# Patient Record
Sex: MALE | Race: WHITE | Employment: FULL TIME | URBAN - METROPOLITAN AREA
[De-identification: names, ages, dates, MRNs, and addresses within clinical notes are randomized per-mention and may not be internally consistent; named-entity substitution may affect disease eponyms.]

---

## 2018-11-12 ENCOUNTER — OFFICE VISIT (OUTPATIENT)
Dept: ORTHOPEDIC SURGERY | Age: 68
End: 2018-11-12
Payer: MEDICARE

## 2018-11-12 VITALS — WEIGHT: 230 LBS | BODY MASS INDEX: 32.93 KG/M2 | HEIGHT: 70 IN

## 2018-11-12 DIAGNOSIS — M25.561 RIGHT KNEE PAIN, UNSPECIFIED CHRONICITY: Primary | ICD-10-CM

## 2018-11-12 PROBLEM — E29.1 HYPOGONADISM MALE: Status: ACTIVE | Noted: 2018-08-15

## 2018-11-12 PROBLEM — Z72.0 TOBACCO ABUSE: Status: ACTIVE | Noted: 2017-07-14

## 2018-11-12 PROBLEM — E11.9 TYPE 2 DIABETES MELLITUS WITHOUT COMPLICATION, WITHOUT LONG-TERM CURRENT USE OF INSULIN (HCC): Status: ACTIVE | Noted: 2017-07-14

## 2018-11-12 PROCEDURE — 3017F COLORECTAL CA SCREEN DOC REV: CPT | Performed by: ORTHOPAEDIC SURGERY

## 2018-11-12 PROCEDURE — G8484 FLU IMMUNIZE NO ADMIN: HCPCS | Performed by: ORTHOPAEDIC SURGERY

## 2018-11-12 PROCEDURE — G8427 DOCREV CUR MEDS BY ELIG CLIN: HCPCS | Performed by: ORTHOPAEDIC SURGERY

## 2018-11-12 PROCEDURE — 1101F PT FALLS ASSESS-DOCD LE1/YR: CPT | Performed by: ORTHOPAEDIC SURGERY

## 2018-11-12 PROCEDURE — 4040F PNEUMOC VAC/ADMIN/RCVD: CPT | Performed by: ORTHOPAEDIC SURGERY

## 2018-11-12 PROCEDURE — 1123F ACP DISCUSS/DSCN MKR DOCD: CPT | Performed by: ORTHOPAEDIC SURGERY

## 2018-11-12 PROCEDURE — 4004F PT TOBACCO SCREEN RCVD TLK: CPT | Performed by: ORTHOPAEDIC SURGERY

## 2018-11-12 PROCEDURE — G8417 CALC BMI ABV UP PARAM F/U: HCPCS | Performed by: ORTHOPAEDIC SURGERY

## 2018-11-12 PROCEDURE — 99204 OFFICE O/P NEW MOD 45 MIN: CPT | Performed by: ORTHOPAEDIC SURGERY

## 2018-11-12 RX ORDER — MELOXICAM 15 MG/1
15 TABLET ORAL
Status: ON HOLD | COMMUNITY
Start: 2018-10-16 | End: 2022-06-28 | Stop reason: HOSPADM

## 2018-11-12 RX ORDER — LISINOPRIL 5 MG/1
10 TABLET ORAL
Refills: 0 | COMMUNITY
Start: 2018-11-04

## 2018-11-12 RX ORDER — ATORVASTATIN CALCIUM 40 MG/1
TABLET, FILM COATED ORAL
Refills: 4 | COMMUNITY
Start: 2018-11-04

## 2018-11-12 RX ORDER — ASPIRIN 81 MG/1
81 TABLET, CHEWABLE ORAL DAILY
Status: ON HOLD | COMMUNITY
Start: 2017-08-15 | End: 2022-06-28 | Stop reason: HOSPADM

## 2018-11-12 RX ORDER — ATORVASTATIN CALCIUM 40 MG/1
TABLET, FILM COATED ORAL
COMMUNITY
Start: 2018-05-07 | End: 2019-11-25

## 2018-11-12 RX ORDER — MELOXICAM 15 MG/1
TABLET ORAL
Refills: 2 | COMMUNITY
Start: 2018-10-16 | End: 2019-11-25

## 2018-11-12 RX ORDER — TRAMADOL HYDROCHLORIDE 50 MG/1
TABLET ORAL
Refills: 0 | Status: ON HOLD | COMMUNITY
Start: 2018-10-24 | End: 2022-06-28 | Stop reason: ALTCHOICE

## 2018-11-12 RX ORDER — BENZONATATE 200 MG/1
CAPSULE ORAL
Refills: 0 | Status: ON HOLD | COMMUNITY
Start: 2018-09-28 | End: 2022-06-28 | Stop reason: ALTCHOICE

## 2018-11-12 RX ORDER — LISINOPRIL 5 MG/1
5 TABLET ORAL
COMMUNITY
Start: 2018-11-01 | End: 2019-11-25

## 2018-11-12 ASSESSMENT — ENCOUNTER SYMPTOMS: SINUS PAIN: 1

## 2018-11-12 NOTE — PROGRESS NOTES
Date:  2018    Name:  Madeline Motley  Address:  [address] [address]    :  1950      Age:   80 y.o.    SSN:  [id number]      Medical Record Number:  [id number]    Reason for Visit:    Chief Complaint    Knee Pain (np right knee pain.)      DOS:2018     HPI: Madeline Motley is a 80 y.o. male here today for evaluation of bilateral knee osteoarthritis. The patient is a  and has had a long-standing history of right knee pain and stiffness that is his chief complaint. He was seen by Dr. Gaetano Traore advised that he will require a total knee arthroplasty after failure of a corticosteroid injection. Consequently they have tried a corticosteroid injection 1 week ago and had little relief of his pain. He reports one incident where his knee was locked upon waking up in the morning but this then abated over time. He has not had repeat mechanical symptoms since. He presents today for a second opinion      Pain Assessment  Location of Pain: Knee  Location Modifiers: Right  Severity of Pain: 2  Quality of Pain: Aching, Sharp, Locking, Popping  Duration of Pain: Persistent  Frequency of Pain: Constant  Aggravating Factors:  (certain movement )  Limiting Behavior: Yes  Relieving Factors: Rest  Result of Injury: No  Work-Related Injury: No  Are there other pain locations you wish to document?: No  ROS: All systems reviewed on patient intake form. Pertinent items are noted in HPI. Past Medical History:   Diagnosis Date    Arthritis     Diabetes [facility])         Past Surgical History:   Procedure Laterality Date    HERNIA REPAIR      TONSILLECTOMY         No family history on file. Social History     Social History    Marital status:       Spouse name: N/A    Number of children: N/A    Years of education: N/A     Social History Main Topics    Smoking status: Current Every Day Smoker    Smokeless tobacco: Never Used    Alcohol use No    Drug use: No   

## 2018-11-13 NOTE — PROGRESS NOTES
Sexual activity: Not Asked     Other Topics Concern    None     Social History Narrative    None       Current Outpatient Prescriptions   Medication Sig Dispense Refill    aspirin 81 MG chewable tablet Take 81 mg by mouth      atorvastatin (LIPITOR) 40 MG tablet TAKE 1 TABLET BY MOUTH ONE TIME A DAY       Lancets Misc. MISC 1 Stick by Other route      lisinopril (PRINIVIL;ZESTRIL) 5 MG tablet Take 5 mg by mouth      meloxicam (MOBIC) 15 MG tablet Take 15 mg by mouth      metFORMIN (GLUCOPHAGE) 500 MG tablet Take 2 tabs iby mouth in the morning and 1 tab by mouth in the evening.  ONE TOUCH ULTRA TEST strip use to test blood sugar once daily  11    atorvastatin (LIPITOR) 40 MG tablet TAKE 1 TABLET BY MOUTH ONE TIME A DAY  4    benzonatate (TESSALON) 200 MG capsule TAKE 1 CAPSULE BY MOUTH THREE TIMES A DAY AS NEEDED FOR COUGH FOR UP TO 10 DAYS  0    lisinopril (PRINIVIL;ZESTRIL) 5 MG tablet TAKE 1 TABLET BY MOUTH ONE TIME A DAY. MUST MAKE APPOINTMENT FOR FURTHER REFILLS.  0    meloxicam (MOBIC) 15 MG tablet TAKE 1 TABLET BY MOUTH ONE TIME A DAY  2    metFORMIN (GLUCOPHAGE) 500 MG tablet TAKE 1 TABLET BY MOUTH ONCE A DAY FOR 7 DAYS, THEN INCREASE TO TWO TIMES A DAY  4    traMADol (ULTRAM) 50 MG tablet TAKE 1 OR 2 TABLETS BY MOUTH EVERY EIGHT HOURS AS NEEDED  0     No current facility-administered medications for this visit. Allergies   Allergen Reactions    No Known Allergies        Vital signs:  Ht 5' 10\" (1.778 m)   Wt 230 lb (104.3 kg)   BMI 33.00 kg/m²        Neuro: Alert & oriented x 3,  normal,  no focal deficits noted. Normal affect. Eyes: sclera clear  Ears: Normal external ear  Mouth:  No perioral lesions  Pulm: Respirations unlabored and regular  Pulse: Regular rate    Skin: Warm, well perfused        Right knee exam    Gait: No use of assistive devices. No antalgic gait. Alignment: Varus Alignment appreciated. Inspection/skin: Quadriceps well developed.  Skin is intact physical examination. In addition, I discussed the patient's condition and treatment options with them. All of their questions were answered. I personally reviewed the patient's pain scale, review of systems, family history, social history, past medical history, allergies and medications. 13 point review of systems was collected today and is filed in the medical record. Farhan Jackson MD  Sports Medicine, Arthroscopic Knee and Shoulder Surgery    This dictation was performed with a verbal recognition program [facility]) and it was checked for errors. It is possible that there are still dictated errors within this office note. If so, please bring any errors to my attention for an addendum. All efforts were made to ensure that this office note is accurate.

## 2019-01-28 ENCOUNTER — OFFICE VISIT (OUTPATIENT)
Dept: ORTHOPEDIC SURGERY | Age: 69
End: 2019-01-28
Payer: MEDICARE

## 2019-01-28 VITALS
SYSTOLIC BLOOD PRESSURE: 126 MMHG | HEIGHT: 70 IN | HEART RATE: 87 BPM | DIASTOLIC BLOOD PRESSURE: 79 MMHG | WEIGHT: 229.94 LBS | BODY MASS INDEX: 32.92 KG/M2

## 2019-01-28 DIAGNOSIS — M17.11 PRIMARY OSTEOARTHRITIS OF RIGHT KNEE: Primary | ICD-10-CM

## 2019-01-28 PROCEDURE — 1101F PT FALLS ASSESS-DOCD LE1/YR: CPT | Performed by: ORTHOPAEDIC SURGERY

## 2019-01-28 PROCEDURE — 1123F ACP DISCUSS/DSCN MKR DOCD: CPT | Performed by: ORTHOPAEDIC SURGERY

## 2019-01-28 PROCEDURE — G8417 CALC BMI ABV UP PARAM F/U: HCPCS | Performed by: ORTHOPAEDIC SURGERY

## 2019-01-28 PROCEDURE — 99213 OFFICE O/P EST LOW 20 MIN: CPT | Performed by: ORTHOPAEDIC SURGERY

## 2019-01-28 PROCEDURE — 4004F PT TOBACCO SCREEN RCVD TLK: CPT | Performed by: ORTHOPAEDIC SURGERY

## 2019-01-28 PROCEDURE — G8427 DOCREV CUR MEDS BY ELIG CLIN: HCPCS | Performed by: ORTHOPAEDIC SURGERY

## 2019-01-28 PROCEDURE — G8484 FLU IMMUNIZE NO ADMIN: HCPCS | Performed by: ORTHOPAEDIC SURGERY

## 2019-01-28 PROCEDURE — 3017F COLORECTAL CA SCREEN DOC REV: CPT | Performed by: ORTHOPAEDIC SURGERY

## 2019-01-28 PROCEDURE — 4040F PNEUMOC VAC/ADMIN/RCVD: CPT | Performed by: ORTHOPAEDIC SURGERY

## 2019-04-22 ENCOUNTER — OFFICE VISIT (OUTPATIENT)
Dept: ORTHOPEDIC SURGERY | Age: 69
End: 2019-04-22
Payer: MEDICARE

## 2019-04-22 VITALS
HEIGHT: 69 IN | DIASTOLIC BLOOD PRESSURE: 85 MMHG | BODY MASS INDEX: 33.92 KG/M2 | WEIGHT: 229 LBS | SYSTOLIC BLOOD PRESSURE: 132 MMHG | HEART RATE: 86 BPM

## 2019-04-22 DIAGNOSIS — M25.562 PAIN IN BOTH KNEES, UNSPECIFIED CHRONICITY: Primary | ICD-10-CM

## 2019-04-22 DIAGNOSIS — M25.561 PAIN IN BOTH KNEES, UNSPECIFIED CHRONICITY: Primary | ICD-10-CM

## 2019-04-22 DIAGNOSIS — M17.0 PRIMARY OSTEOARTHRITIS OF BOTH KNEES: ICD-10-CM

## 2019-04-22 PROCEDURE — 99214 OFFICE O/P EST MOD 30 MIN: CPT | Performed by: ORTHOPAEDIC SURGERY

## 2019-04-22 PROCEDURE — 1123F ACP DISCUSS/DSCN MKR DOCD: CPT | Performed by: ORTHOPAEDIC SURGERY

## 2019-04-22 PROCEDURE — MISC265 BAUERFEIND GENUTRAIN KNEE SLEEVE: Performed by: ORTHOPAEDIC SURGERY

## 2019-04-22 PROCEDURE — 3017F COLORECTAL CA SCREEN DOC REV: CPT | Performed by: ORTHOPAEDIC SURGERY

## 2019-04-22 PROCEDURE — 4040F PNEUMOC VAC/ADMIN/RCVD: CPT | Performed by: ORTHOPAEDIC SURGERY

## 2019-04-22 PROCEDURE — 20610 DRAIN/INJ JOINT/BURSA W/O US: CPT | Performed by: ORTHOPAEDIC SURGERY

## 2019-04-22 PROCEDURE — 4004F PT TOBACCO SCREEN RCVD TLK: CPT | Performed by: ORTHOPAEDIC SURGERY

## 2019-04-22 PROCEDURE — G8427 DOCREV CUR MEDS BY ELIG CLIN: HCPCS | Performed by: ORTHOPAEDIC SURGERY

## 2019-04-22 PROCEDURE — G8417 CALC BMI ABV UP PARAM F/U: HCPCS | Performed by: ORTHOPAEDIC SURGERY

## 2019-04-22 ASSESSMENT — ENCOUNTER SYMPTOMS: SINUS PAIN: 1

## 2019-04-22 NOTE — PROGRESS NOTES
negative. The anterior and posterior drawer signs are negative. The cruciate and collateral ligaments are intact. The patient has a negative hyperflexion test.  No bursal swelling is present. There is mild crepitus. There is no pain with compression of the patella. The patella apprehension sign is negative. Q angles are within normal limits. There is no pretibial edema. Pulses are symmetric. Sensation is intact to light-touch. Left knee exam    Examination of the left knee shows normal alignment and full range of motion. The quadriceps are well-developed. Trace effusion is present. No soft tissue swelling is noted. The patient has no tenderness to palpation about the joint. There is a negative Kyra sign. The Lachman sign is negative. The anterior and posterior drawer signs are negative. The cruciate and collateral ligaments are intact. The patient has a negative hyperflexion test.  No bursal swelling is present. There is mild crepitus. There is slight pain with compression of the patella. The patella apprehension sign is negative. Q angles are within normal limits. There is no pretibial edema. Pulses are symmetric. Sensation is intact to light-touch. Bilateral Knee X-rays    A total of 4 x-rays of the right and left knees were obtained. They include bilateral Merchant views, standing AP's, Chadwick and lateral views. I reviewed the films were reviewed in the office today. Bilateral knee osteoarthritis. Bone-on-bone medial joint space narrowing on the right. Mild joint space narrowing on the left. Impression: Bilateral knee osteoarthritis. The etiology, natural history and treatment options for this disorder were discussed. The roles of activity modification, anti-inflammatory use, injections, bracing, physical therapy and surgical interventions were all described to the patient and questions were answered.     Treatment today will be steroid injection for the right knee. He feels like this is the most symptomatic. With regard to the left knee patient be fitted for bracing and strengthening exercises. He'll see us back in a couple weeks to evaluate his response. The indications for and risks of steroid injection as well as treatment alternatives were discussed with the patient who consented to the procedure. Under sterile conditions and after informed consent was obtained the patient was given an injection into the right knee. 80 mg of Depo-Medrol and 4 mL of 0.5% ropivacaine were placed in the knee after it was prepped with chlorhexidine. This resulted in good relief of symptoms. There were no complications. The patient was advised to ice the knee this evening and to avoid vigorous activities for the next 2 days. They were advised to call us if there was any erythema, enduration, swelling or increasing pain. I personally reviewed the patient's pain scale, review of systems, family history, social history, past medical history, allergies and medications. 13 point review of systems was collected today and is included in the medical record. Nhi Williamson MD  Sports Medicine, Knee and Shoulder Surgery    This dictation was performed with a verbal recognition program [facility]) and it was checked for errors. It is possible that there are still dictated errors within this office note. If so, please bring any errors to my attention for an addendum. All efforts were made to ensure that this office note is accurate.

## 2019-04-22 NOTE — PROGRESS NOTES
Cortisone injection: RIGHT KNEE    2cc depo medrol 40mg    KLS:[id number]  Exp:1/2021  Ndc:8636-2086-71    4cc lidocaine 1.0%    Lot:[id number]  Exp:08/2020  Nd: [id number]

## 2019-04-22 NOTE — PROGRESS NOTES
Review of Systems   Constitutional: Positive for unexpected weight change. HENT: Positive for sinus pain. Gastrointestinal:        Hepatitis / liver trouble    Musculoskeletal:        Bilateral knee pain    All other systems reviewed and are negative.

## 2019-11-25 ENCOUNTER — OFFICE VISIT (OUTPATIENT)
Dept: ORTHOPEDIC SURGERY | Age: 69
End: 2019-11-25
Payer: MEDICARE

## 2019-11-25 VITALS
BODY MASS INDEX: 34.72 KG/M2 | SYSTOLIC BLOOD PRESSURE: 122 MMHG | WEIGHT: 229.06 LBS | HEIGHT: 68 IN | HEART RATE: 78 BPM | DIASTOLIC BLOOD PRESSURE: 73 MMHG

## 2019-11-25 DIAGNOSIS — M17.12 PRIMARY OSTEOARTHRITIS OF LEFT KNEE: Primary | ICD-10-CM

## 2019-11-25 PROCEDURE — G8427 DOCREV CUR MEDS BY ELIG CLIN: HCPCS | Performed by: ORTHOPAEDIC SURGERY

## 2019-11-25 PROCEDURE — G8484 FLU IMMUNIZE NO ADMIN: HCPCS | Performed by: ORTHOPAEDIC SURGERY

## 2019-11-25 PROCEDURE — G8417 CALC BMI ABV UP PARAM F/U: HCPCS | Performed by: ORTHOPAEDIC SURGERY

## 2019-11-25 PROCEDURE — 4040F PNEUMOC VAC/ADMIN/RCVD: CPT | Performed by: ORTHOPAEDIC SURGERY

## 2019-11-25 PROCEDURE — 4004F PT TOBACCO SCREEN RCVD TLK: CPT | Performed by: ORTHOPAEDIC SURGERY

## 2019-11-25 PROCEDURE — 1123F ACP DISCUSS/DSCN MKR DOCD: CPT | Performed by: ORTHOPAEDIC SURGERY

## 2019-11-25 PROCEDURE — 99213 OFFICE O/P EST LOW 20 MIN: CPT | Performed by: ORTHOPAEDIC SURGERY

## 2019-11-25 PROCEDURE — 3017F COLORECTAL CA SCREEN DOC REV: CPT | Performed by: ORTHOPAEDIC SURGERY

## 2019-11-25 PROCEDURE — 20610 DRAIN/INJ JOINT/BURSA W/O US: CPT | Performed by: ORTHOPAEDIC SURGERY

## 2019-11-25 RX ORDER — TESTOSTERONE CYPIONATE 200 MG/ML
INJECTION INTRAMUSCULAR
Refills: 2 | COMMUNITY
Start: 2019-11-08

## 2019-11-25 RX ORDER — OMEPRAZOLE 40 MG/1
CAPSULE, DELAYED RELEASE ORAL
Refills: 0 | Status: ON HOLD | COMMUNITY
Start: 2019-10-30 | End: 2022-06-28 | Stop reason: ALTCHOICE

## 2019-11-25 RX ORDER — METHYLPREDNISOLONE ACETATE 40 MG/ML
40 INJECTION, SUSPENSION INTRA-ARTICULAR; INTRALESIONAL; INTRAMUSCULAR; SOFT TISSUE ONCE
Status: COMPLETED | OUTPATIENT
Start: 2019-11-25 | End: 2019-11-25

## 2019-11-25 RX ADMIN — METHYLPREDNISOLONE ACETATE 40 MG: 40 INJECTION, SUSPENSION INTRA-ARTICULAR; INTRALESIONAL; INTRAMUSCULAR; SOFT TISSUE at 17:43

## 2022-03-28 ENCOUNTER — OFFICE VISIT (OUTPATIENT)
Dept: ORTHOPEDIC SURGERY | Age: 72
End: 2022-03-28
Payer: MEDICARE

## 2022-03-28 VITALS — BODY MASS INDEX: 32.88 KG/M2 | HEIGHT: 69 IN | WEIGHT: 222 LBS

## 2022-03-28 DIAGNOSIS — M25.561 RIGHT KNEE PAIN, UNSPECIFIED CHRONICITY: Primary | ICD-10-CM

## 2022-03-28 DIAGNOSIS — M17.11 PRIMARY OSTEOARTHRITIS OF RIGHT KNEE: ICD-10-CM

## 2022-03-28 PROCEDURE — G8484 FLU IMMUNIZE NO ADMIN: HCPCS | Performed by: ORTHOPAEDIC SURGERY

## 2022-03-28 PROCEDURE — 3017F COLORECTAL CA SCREEN DOC REV: CPT | Performed by: ORTHOPAEDIC SURGERY

## 2022-03-28 PROCEDURE — 99214 OFFICE O/P EST MOD 30 MIN: CPT | Performed by: ORTHOPAEDIC SURGERY

## 2022-03-28 PROCEDURE — G8427 DOCREV CUR MEDS BY ELIG CLIN: HCPCS | Performed by: ORTHOPAEDIC SURGERY

## 2022-03-28 PROCEDURE — 4004F PT TOBACCO SCREEN RCVD TLK: CPT | Performed by: ORTHOPAEDIC SURGERY

## 2022-03-28 PROCEDURE — 1123F ACP DISCUSS/DSCN MKR DOCD: CPT | Performed by: ORTHOPAEDIC SURGERY

## 2022-03-28 PROCEDURE — 4040F PNEUMOC VAC/ADMIN/RCVD: CPT | Performed by: ORTHOPAEDIC SURGERY

## 2022-03-28 PROCEDURE — G8417 CALC BMI ABV UP PARAM F/U: HCPCS | Performed by: ORTHOPAEDIC SURGERY

## 2022-03-28 NOTE — PROGRESS NOTES
Chief Complaint  Follow-up (old patient/same problem right knee)      History of Present Illness:  Pop Garcia is a pleasant 71 y.o. male here for follow-up regarding his bilateral knee pain. He has severe advanced bilateral knee osteoarthritis. He has tried anti-inflammatories, therapy exercises, intra-articular cortisone injections with no improvement. His right knee pain is refractory to steroid injections at this point. He would like to discuss a total knee arthroplasty. Medical History:  Patient's medications, allergies, past medical, surgical, social and family histories were reviewed and updated as appropriate. ROS: Review of systems reviewed from Patient History Form completed today and available in the patient's chart under the Media tab. Pertinent items are noted in HPI  Review of systems reviewed from Patient History Form completed today and available in the patient's chart under the Media tab. Vital Signs: There were no vitals taken for this visit. Right Knee Exam:        Gait/Alignment: Varus alignment                            Patella tracking: Normal      Inspection/Skin: No rashes are identified.     Effusion: Small effusion     Palpation: Medial joint line tenderness. Mild crepitus.     Range of Motion: Full extension and 120° of flexion     Strength: Mild quadriceps weakness.     Ligamentous Stability: Pseudo-laxity is present medially. Anterior and posterior cruciate ligaments were intact. Lateral collateral ligament is intact.     Neurologic and vascular: Skin is warm dry and well perfused. Sensation is intact to light touch over the knee.     Additional findings: Calf soft nontender. No patellar instability. Left Knee Exam:        Gait/Alignment: Varus alignment                            Patella tracking: Normal      Inspection/Skin: No rashes are identified.     Effusion: Small effusion     Palpation: Medial joint line tenderness.  Mild crepitus.     Range of Motion: Full extension and 120° of flexion     Strength: Mild quadriceps weakness.     Ligamentous Stability: Pseudo-laxity is present medially. Anterior and posterior cruciate ligaments were intact. Lateral collateral ligament is intact.     Neurologic and vascular: Skin is warm dry and well perfused. Sensation is intact to light touch over the knee.     Additional findings: Calf soft nontender. No patellar instability. Radiology:       Pertinent imaging was interpreted and reviewed with the patient today, images only - no report available. Radiographs were obtained and reviewed in the office; 4 views: bilateral PA, bilateral Roena Moat, bilateral Merchants AND Right lateral    Impression: severe, advanced right knee osteoarthritis         Assessment :  72 yo male with severe advanced bilateral knee osteoarthritis     Impression:  Encounter Diagnosis   Name Primary?  Right knee pain, unspecified chronicity Yes       Office Procedures:  Orders Placed This Encounter   Procedures    XR KNEE RIGHT (MIN 4 VIEWS)     Order Specific Question:   Reason for exam:     Answer:   pain    XR KNEE LEFT (3 VIEWS)     Order Specific Question:   Reason for exam:     Answer:   pain         Plan: Pertinent imaging was reviewed. The etiology, natural history, and treatment options for the disorder were discussed. The roles of activity medication, antiinflammatories, injections, bracing, physical therapy, and surgical interventions were all described to the patient and questions were answered. Patient has severe advanced bilateral knee osteoarthritis, worse on the right. His right knee pain has become refractory to anti-inflammatories, steroid injections, activity modification, weight loss. Patient has severe pain that is 24/7 and keeps him up at night. He works as a  and is unable to do his job at this point. At this time I do believe he is candidate for a right total knee arthroplasty.   He would like to proceed with this. Patient has bone-on-bone osteoarthritis which is limiting day-to-day activities and significantly impacting quality of life. Treatment has included exercises as well as anti-inflammatories medications and steroid injections without relief. Symptoms have been ongoing for over a year. At this point the patient is reasonable candidate for total knee arthroplasty. The procedure was discussed in detail as well as the potential complications of DVT, pulmonary embolism, loosening, persistent pain, infection, bleeding, neurologic injury and complications from anesthesia. The time required for rehabilitation was discussed. The patient feels that there is adequate support at home and that this would be a reasonable option after surgery. We will enroll the patient in the preoperative total joint replacement program at [facility], [facility]..  We will check the preoperative hematocrit/hemoglobin and schedule the surgery as appropriate. Risks, benefits and potential complications of total knee arthroplasty surgery were discussed with the patient. Risks discussed include but are not limited to bleeding, infection, anesthetic risk, injury to nerves and blood vessels, deep vein thrombosis, residual stiffness and weakness, residual pain and the possible need for revision surgery. The patient also understands that anesthetic risks include cardiopulmonary issues, drug reactions and even death. The patient voices an understanding of the importance of physical therapy and home exercises after surgery. All questions were answered. Preoperative labs will be reviewed prior to surgery  Urine analysis  Results will be reviewed prior to surgery. No personal  history of deep vein thrombosis, stents, or blood clots reported. No Family history of blood clots. BMI is <40. No personal history of bleeding disorders. No personal allergies to narcotic pain medications reported.   No personal history antibiotic allergies reported. No personal metal allergies reported. No personal recent infection(s) reported. No personal urinary retention reported. Medical history of diabetes, last A1C dated 02/28/2022 was 6.1\", discussed that A1C greater than 7 increases perioperative complications. .   Intraoperatively IV Tranexamic Acid will be used because no history of stents or blood clots. No postop Venodynes ordered. For postoperative DVT prophylaxis 81mg twice daily Aspirin, no contraindications to aspirin is anticoagulant of choice. Patient will also be discharged with 200 mg daily Celebrex, denies sulfa allergy. Followup 7-10 days postop or sooner if needed. First 2 weeks postop will be home therapy then plans to attend physical therapy at [place]. Tayo Lamb is in agreement with this plan. All questions were answered to patient's satisfaction and was encouraged to call with any further questions. Total time spent for evaluation, education, and development of treatment plan: 35 minutes    Javon Weber, [address] [facility]  3/28/2022    During this exam, IJavon PA-C, functioned as a scribe for Dr. Mynor Valle. The history taking and physical examination were performed by Dr. Mynor Valle. All counseling during the appointment was performed between the patient and Dr. Mynor Valle. 3/28/2022 10:45 AM    This dictation was performed with a verbal recognition program ([employer]) and it was checked for errors. It is possible that there are still dictated errors within this office note. If so, please bring any areas to my attention for an addendum. All efforts were made to ensure that this office note is accurate. I attest that I met personally with the patient, performed the described exam, reviewed the radiographic studies and medical records associated with this patient and supervised the services that are described above.      Dejon Li MD

## 2022-05-16 ENCOUNTER — TELEPHONE (OUTPATIENT)
Dept: ORTHOPEDIC SURGERY | Age: 72
End: 2022-05-16

## 2022-05-16 NOTE — TELEPHONE ENCOUNTER
General Question     Subject: PATIENT'S DAUGHTER IS REQUESTING A PHONE CALL ABOUT KNEE REPLACEMENT [address].  Patient's Ana Blank Number: [phone]

## 2022-06-13 ENCOUNTER — TELEPHONE (OUTPATIENT)
Dept: ORTHOPEDIC SURGERY | Age: 72
End: 2022-06-13

## 2022-06-13 ENCOUNTER — OFFICE VISIT (OUTPATIENT)
Dept: ORTHOPEDIC SURGERY | Age: 72
End: 2022-06-13

## 2022-06-13 VITALS — HEIGHT: 70 IN | WEIGHT: 222 LBS | BODY MASS INDEX: 31.78 KG/M2

## 2022-06-13 DIAGNOSIS — M25.561 RIGHT KNEE PAIN, UNSPECIFIED CHRONICITY: Primary | ICD-10-CM

## 2022-06-13 PROCEDURE — MISC265 BAUERFEIND GENUTRAIN KNEE SLEEVE: Performed by: ORTHOPAEDIC SURGERY

## 2022-06-13 PROCEDURE — PREOPEXAM PRE-OP EXAM: Performed by: ORTHOPAEDIC SURGERY

## 2022-06-13 RX ORDER — BACITRACIN, NEOMYCIN, POLYMYXIN B 400; 3.5; 5 [USP'U]/G; MG/G; [USP'U]/G
OINTMENT TOPICAL
Qty: 1 EACH | Refills: 0 | Status: SHIPPED | OUTPATIENT
Start: 2022-06-13 | End: 2022-06-23

## 2022-06-13 NOTE — LETTER
[facility]  Surgery Precert & Billing Form:    DEMOGRAPHICS:                                                                                                       Patient Name:  Izzy Wick  Patient :  1950   Patient SS#:      Patient Phone:  [phone] (home)  Alt.  Patient Phone:    Patient Address:  [address] [address]    PCP:  Ana María Dickinsonuth: MEDICARE/CAMILA    DIAGNOSIS & PROCEDURE:                                                                                      Diagnosis: OA R KNEE  M17.11  Operation: right    SURGERY  INFORMATION  Date of Surgery:   2022  Location:    [facility]  Type:    Outpatient  23 hour hold:  No  Surgeon:          Laureen Sheridan MD         22     BILLING INFORMATION:                                                                                                Physician Procedure                                            CPT Codes                      PA, or Fellow Procedure                                      CPT Codes                      Precert information:

## 2022-06-13 NOTE — PROGRESS NOTES
Chief Complaint  Pre-op L TKA 6/28/2022    History of Present Illness:  Wyatt Cernaece is a pleasant 71 y.o. male here for preop visit re L TKA 6/28/2022. He's here for a skin check of the posterior knee as well. Medical History:  Patient's medications, allergies, past medical, surgical, social and family histories were reviewed and updated as appropriate. ROS: Review of systems reviewed from Patient History Form completed today and available in the patient's chart under the Media tab. Pertinent items are noted in HPI  Review of systems reviewed from Patient History Form completed today and available in the patient's chart under the Media tab. Vital Signs: There were no vitals taken for this visit. Right Knee Exam:        Gait/Alignment: Varus alignment                            Patella tracking: Normal      Inspection/Skin: Small scab on posterior knee no sign of cellulitis     Effusion: Small effusion     Palpation: Medial joint line tenderness. Mild crepitus.     Range of Motion: Full extension and 110° of flexion     Strength: Mild quadriceps weakness.     Ligamentous Stability: Pseudo-laxity is present medially. Anterior and posterior cruciate ligaments were intact. Lateral collateral ligament is intact.     Neurologic and vascular: Skin is warm dry and well perfused. Sensation is intact to light touch over the knee.     Additional findings: Calf soft nontender. No patellar instability. Left Knee Exam:        Gait/Alignment: Varus alignment                            Patella tracking: Normal      Inspection/Skin: No rashes are identified.     Effusion: Small effusion     Palpation: Medial joint line tenderness. Mild crepitus.     Range of Motion: Full extension and 120° of flexion     Strength: Mild quadriceps weakness.     Ligamentous Stability: Pseudo-laxity is present medially. Anterior and posterior cruciate ligaments were intact.  Lateral collateral ligament is intact.     Neurologic and vascular: Skin is warm dry and well perfused. Sensation is intact to light touch over the knee.     Additional findings: Calf soft nontender. No patellar instability. Radiology:       Pertinent imaging was interpreted and reviewed with the patient today, images only - no report available. Radiographs were obtained and reviewed in the office; 4 views: bilateral PA, bilateral Ferdie Bianchi, bilateral [employer] AND Right lateral    Impression: severe, advanced right knee osteoarthritis         Assessment :  70 yo male with severe advanced bilateral knee osteoarthritis     Impression:  Encounter Diagnosis   Name Primary?  Right knee pain, unspecified chronicity Yes       Office Procedures:  Orders Placed This Encounter   Procedures    [employer]ind Genutrain Knee Sleeve $75     Patient was supplied a Bauerfeind Genutrain Knee Sleeve. This retail item was supplied to provide functional support and assist in protecting the affected area. Verbal and written instructions for the use of and application of this item were provided. The patient was educated and fit by a healthcare professional with expert knowledge and specialization in brace application. They were instructed to contact the office immediately should the equipment result in increased pain, decreased sensation, increased swelling or worsening of the condition. Plan: Pertinent imaging was reviewed. The etiology, natural history, and treatment options for the disorder were discussed. The roles of activity medication, antiinflammatories, injections, bracing, physical therapy, and surgical interventions were all described to the patient and questions were answered. Patient has severe advanced bilateral knee osteoarthritis, worse on the right. His right knee pain has become refractory to anti-inflammatories, steroid injections, activity modification, weight loss.   Patient has severe pain that is 24/7 and keeps him up at night. He works as a  and is unable to do his job at this point. At this time I do believe he is candidate for a right total knee arthroplasty. He would like to proceed with this. Patient has bone-on-bone osteoarthritis which is limiting day-to-day activities and significantly impacting quality of life. Treatment has included exercises as well as anti-inflammatories medications and steroid injections without relief. Symptoms have been ongoing for over a year. At this point the patient is reasonable candidate for total knee arthroplasty. The procedure was discussed in detail as well as the potential complications of DVT, pulmonary embolism, loosening, persistent pain, infection, bleeding, neurologic injury and complications from anesthesia. The time required for rehabilitation was discussed. The patient feels that there is adequate support at home and that this would be a reasonable option after surgery. We will enroll the patient in the preoperative total joint replacement program at [facility]  We will check the preoperative hematocrit/hemoglobin and schedule the surgery as appropriate. Risks, benefits and potential complications of total knee arthroplasty surgery were discussed with the patient. Risks discussed include but are not limited to bleeding, infection, anesthetic risk, injury to nerves and blood vessels, deep vein thrombosis, residual stiffness and weakness, residual pain and the possible need for revision surgery. The patient also understands that anesthetic risks include cardiopulmonary issues, drug reactions and even death. The patient voices an understanding of the importance of physical therapy and home exercises after surgery. All questions were answered. Preoperative labs will be reviewed prior to surgery  Urine analysis  Results will be reviewed prior to surgery. No personal  history of deep vein thrombosis, stents, or blood clots reported.    No Family history of blood clots. BMI is <40. No personal history of bleeding disorders. No personal allergies to narcotic pain medications reported. No personal history antibiotic allergies reported. No personal metal allergies reported. No personal recent infection(s) reported. No personal urinary retention reported. Medical history of diabetes, last A1C dated 02/28/2022 was 6.1\", discussed that A1C greater than 7 increases perioperative complications. . 6/6/2022 HA1C 7.1 (states he got off his good diet and has since readjusted, also increased metformin)  Intraoperatively IV Tranexamic Acid will be used because no history of stents or blood clots. No postop Venodynes ordered. For postoperative DVT prophylaxis 325 mg daily Aspirin, no contraindications to aspirin is anticoagulant of choice. Patient will also be discharged with 200 mg daily Celebrex, denies sulfa allergy. Prescribed neosporin to heal the posterior knee small scab. He will call prior to surgery make sure skin completely heals. Followup 7-10 days postop or sooner if needed. First 2 weeks postop will be home therapy then plans to attend physical therapy at [place]. Marty Maya is in agreement with this plan. All questions were answered to patient's satisfaction and was encouraged to call with any further questions. Total time spent for evaluation, education, and development of treatment plan: 35 minutes    Lindsey Reese, Ernesto Pugh E Dano Muñoz    The encounter with Marty Maya was supervised by Dr. Keiry Snow who personally examined the patient and reviewed the plan. This dictation was performed with a verbal recognition program (DRAGON) and it was checked for errors. It is possible that there are still dictated errors within this office note. If so, please bring any errors to my attention for an addendum.   All efforts were made to ensure that this office note is accurate. I attest that I met personally with the patient, performed the described exam, reviewed the radiographic studies and medical records associated with this patient and supervised the services that are described above.      Alisa Chong MD

## 2022-06-13 NOTE — TELEPHONE ENCOUNTER
Notified [address] [place] [address] Office -     Completed [facility] form for patient

## 2022-06-20 ENCOUNTER — TELEPHONE (OUTPATIENT)
Dept: ORTHOPEDIC SURGERY | Age: 72
End: 2022-06-20

## 2022-06-20 NOTE — TELEPHONE ENCOUNTER
Orthopedic Nurse Navigator Summary    Patient Name:  Ran Ash  Anticipated Date of Surgery:  06/28/22  Attended Pre-op Education Class:  Video sent to patient email  PCP: Michell Kramer CNP  Date of PCP visit for H&P: 06/08/22  Is patient in a Pain Management program:  Review of Medical history reveals history of: Diabetes, HTN    Critical Lab Values  - Hemoglobin (g/dL):  Date: 06/10/22 Value 17.7  - Hematocrit(%): Date: 06/10/22  Value 53.2  - HgbA1C:  Date: 06/10/22 Value 7.1  - Albumin:  Date: 06/10/22  Value 4.5  - BUN:  Date:  06/10/22  Value 11  - Creatinine:  Date: 06/10/22  Value 0.94    Coronary Artery Disease/HTN/CHF history: Yes  Cardiologist: No  Cardiac clearance necessary:  No  Date of cardiac clearance appt:  Final Cardiac recommendations: On any anticoagulation: Aspirin 81 mg QD    Diabetes History:  Yes  Most recent HgbA1C: 7.1  Pulmonary:  COPD/Emphysema/Use of home oxygen: No  Alcohol use: Yes  BMI greater than 40 at time of scheduling: Additional medical concerns:   Additional recommendations for above concerns:  Attended Pre-Hab program:    Anticipated Discharge Disposition:  Home with [facility] AT [facility]  Who will be with patient at home following discharge:  Daughter- she lives with him, he has a daughter-in-law that can be there when his daughter is at work  Equipment patient already has:  cane  Bedroom on first or second floor:  First   Bathroom on first or second floor:  First   Weight bearing status:  wbat  Pre-op ambulatory status: painful ambulation, uses cane at times  Number of entry steps:  2  Caregiver assistance:  Full time    Krystal Moran RN  Date:  06/20/22

## 2022-06-20 NOTE — TELEPHONE ENCOUNTER
General Question     Subject: AFLAC PAPERWORK, SOAP FOR SX  Patient : Gwyn Shetty Number: [phone]    PATIENT'S DAUGHTER IS REQ TO P/U AFLAC PAPERWORK AND SOAP FOR HER DADS UPCOMING SX AT THE [facility] OFFICE. PLEASE CONTACT AT ABOVE NUMBER.

## 2022-06-23 RX ORDER — GLIPIZIDE 5 MG/1
TABLET ORAL
COMMUNITY
Start: 2022-06-10

## 2022-06-23 NOTE — PROGRESS NOTES
[facility] PRE-SURGICAL TESTING INSTRUCTIONS                                  PRIOR TO PROCEDURE DATE:        1. PLEASE FOLLOW ANY  GUIDELINES/ INSTRUCTIONS PRIOR TO YOUR PROCEDURE AS ADVISED BY YOUR SURGEON. 2. Arrange for someone to drive you home and be with you for the first 24 hours after discharge for your safety after your procedure for which you received sedation. Ensure it is someone we can share information with regarding your discharge. 3. You must contact your surgeon for instructions IF:   You are taking any blood thinners, aspirin, anti-inflammatory or vitamin E.   There is a change in your physical condition such as a cold, fever, rash, cuts, sores or any other infection, especially near your surgical site. 4. Do not drink alcohol the day before or day of your procedure. 5. A Pre-op History and Physical for surgery MUST be completed by your Physician or Urgent Care within 30 days of your procedure date. Please bring a copy with you on the day of your procedure and along with any other testing performed. THE DAY OF YOUR PROCEDURE:  1. Follow instructions for ARRIVAL TIME as DIRECTED BY YOUR SURGEON. 2. Enter the MAIN entrance from [address] and follow the signs to the free [employer] or [employer] parking (offered free of charge 6am-5pm). 3. Enter the Main Entrance of the hospital (do not enter from the lower level of the parking garage). Upon entrance, check in with the  at the main desk on your left. If no one is available at the desk, proceed into the [facility] Waiting Room and go through the door directly into the [facility]. There is a Check-in desk ACROSS from Room 5 (marked with a sign hanging from the ceiling). The phone number for the surgery center is [phone]. 4. Please call [phone] option #2 option #2 if you have not been preregistered yet.   On the day of your procedure bring your insurance card and photo ID. You will be registered at your bedside once brought back to your room. 5. DO NOT EAT ANYTHING eight hours prior to your arrival for surgery. May have 8 ounces of water 4 hours prior to your arrival for surgery. NOTE: ALL Gastric, Bariatric and Bowel surgery patients MUST follow their surgeon's instructions. 6. MEDICATIONS    Take the following medications with a SMALL sip of water: none   Bariatric patient's call surgeon if on diabetic medications as some need to be stopped 1 week preop   Use your usual dose of inhalers the morning of surgery. BRING your rescue inhaler with you to hospital.    Anesthesia does NOT want you to take insulin the morning of surgery. They will control your blood sugar while you are at the hospital. Please contact your ordering physician for instructions regarding your insulin the night before your procedure. If you have an insulin pump, please keep it set on basal rate. 7. Do not swallow water when brushing teeth. No gum, candy, mints or ice chips. Refrain from smoking or at least decrease the amount. 8. Dress in loose, comfortable clothing appropriate for redressing after your procedure. Do not wear jewelry (including body piercings), make-up (especially NO eye make-up), fingernail polish (NO toenail polish if foot/leg surgery), lotion, powders or metal hairclips. 9. Dentures, glasses, or contacts will need to be removed before your procedure. Bring cases for your glasses, contacts, dentures, or hearing aids to protect them while you are in surgery. 10. If you use a CPAP, please bring it with you on the day of your procedure. 11. We recommend that valuable personal  belongings such as cash, cell phones, e-tablets or jewelry, be left at home during your stay. The hospital will not be responsible for valuables that are not secured in the hospital safe.  However, if your insurance requires a co-pay, you may want to bring a method of payment, i.e. Check or credit card, if you wish to pay your co-pay the day of surgery. 12. If you are to stay overnight, you may bring a bag with personal items. Please have any large items you may need brought in by your family after your arrival to your hospital room. 15. If you have a Living Will or Durable Power of , please bring a copy on the day of your procedure. 15. With your permission, one family member may accompany you while you are being prepared for surgery. Once you are ready, additional family members may join you. HOW WE KEEP YOU SAFE and WORK TO PREVENT SURGICAL SITE INFECTIONS:  1. Health care workers should always check your ID bracelet to verify your name and birth date. You will be asked many times to state your name, date of birth, and allergies. 2. Health care workers should always clean their hands with soap or alcohol gel before providing care to you. It is okay to ask anyone if they cleaned their hands before they touch you. 3. You will be actively involved in verifying the type of procedure you are having and ensuring the correct surgical site. This will be confirmed multiple times prior to your procedure. Do NOT radha your surgery site UNLESS instructed to by your surgeon. 4. Do not shave or wax for 72 hours prior to procedure near your operative site. Shaving with a razor can irritate your skin and make it easier to develop an infection. On the day of your procedure, any hair that needs to be removed near the surgical site will be clipped by a healthcare worker using a special clippers designed to avoid skin irritation. 5. When you are in the operating room, your surgical site will be cleansed with a special soap, and in most cases, you will be given an antibiotic before the surgery begins. What to expect AFTER YOUR PROCEDURE:  1. Immediately following your procedure, your will be taken to the PACU for the first phase of your recovery.   Your nurse will help you recover from any potential side effects of anesthesia, such as extreme drowsiness, changes in your vital signs or breathing patterns. Nausea, headache, muscle aches, or sore throat may also occur after anesthesia. Your nurse will help you manage these potential side effects. 2. For comfort and safety, arrange to have someone at home with you for the first 24 hours after discharge. 3. You and your family will be given written instructions about your diet, activity, dressing care, medications, and return visits. 4. Once at home, should issues with nausea, pain, or bleeding occur, or should you notice any signs of infection, you should call your surgeon. 5. Always clean your hands before and after caring for your wound. Do not let your family touch your surgery site without cleaning their hands. 6. Narcotic pain medications can cause significant constipation. You may want to add a stool softener to your postoperative medication schedule or speak to your surgeon on how best to manage this SIDE EFFECT. Thank you for allowing us to care for you. We strive to exceed your expectations in the delivery of care and service provided to you and your family. If you need to contact the [place] [address] staff for any reason, please call us at [phone]    Instructions reviewed with patient during preadmission testing phone interview.   Davonte Angelo RN.6/23/2022 .3:53 PM      ADDITIONAL EDUCATIONAL INFORMATION REVIEWED PER PHONE WITH YOU AND/OR YOUR FAMILY:  Yes Hibiclens® Bathing Instructions

## 2022-06-23 NOTE — PROGRESS NOTES
Place patient label inside box (if no patient label, complete below)  Name:  :  MR#:           Tess Reilly / PROCEDURE  1. I (we), Adriana Strange (Patient Name) authorize DR. Harish Harris (Provider / Berenice Mcdonald) and/or such assistants as may be selected by him/her, to perform the following operation/procedure(s): RIGHT TOTAL KNEE REPLACEMENT        Note: If unable to obtain consent prior to an emergent procedure, document the emergent reason in the medical record. This procedure has been explained to my (our) satisfaction and included in the explanation was:  A) The intended benefit, nature, and extent of the procedure to be performed;  B) The significant risks involved and the probability of success;  C) Alternative procedures and methods of treatment;  D) The dangers and probable consequences of such alternatives (including no procedure or treatment); E) The expected consequences of the procedure on my future health;  F) Whether other qualified individuals would be performing important surgical tasks and/or whether  would be present to advise or support the procedure. I (we) understand that there are other risks of infection and other serious complications in the pre-operative/procedural and postoperative/procedural stages of my (our) care. I (we) have asked all of the questions which I (we) thought were important in deciding whether or not to undergo treatment or diagnosis. These questions have been answered to my (our) satisfaction. I (we) understand that no assurance can be given that the procedure will be a success, and no guarantee or warranty of success has been given to me (us). 2. It has been explained to me (us) that during the course of the operation/procedure, unforeseen conditions may be revealed that necessitate extension of the original procedure(s) or different procedure(s) than those set forth in Paragraph 1.  I (we) authorize and request that the above-named physician, his/her assistants or his/her designees, perform procedures as necessary and desirable if deemed to be in my (our) best interest.     Revised 8/2/2021                                                                          Page 1 of 2       3. I acknowledge that health care personnel may be observing this procedure for the purpose of medical education or other specified purposes as may be necessary as requested and/or approved by my (our) physician. 4. I (we) consent to the disposal by the hospital Pathologist of the removed tissue, parts or organs in accordance with hospital policy. 5. I do ____ do not ____ consent to the use of a local infiltration pain blocking agent that will be used by my provider/surgical provider to help alleviate pain during my procedure. 6. I do ____ do not ____ consent to an emergent blood transfusion in the case of a life-threatening situation that requires blood components to be administered. This consent is valid for 24 hours from the beginning of the procedure. 7. This patient does ____ or does not ____ currently have a DNR status/order. If DNR order is in place, obtain Addendum to the Surgical Consent for ALL Patients with a DNR Order to address rupal-operative status for limited intervention or DNR suspension.      8. I have read and fully understand the above Consent for Operation/Procedure and that all blanks were completed before I signed the consent.   _____________________________       _____________________      ____/____am/pm  Signature of Patient or legal representative      Printed Name / Relationship            Date / Time   ____________________________       _____________________      ____/____am/pm  Witness to Signature                                    Printed Name                    Date / Time     If patient is unable to sign or is a minor, complete the following)  Patient is a minor, ____ years of age, or unable to sign because:   ______________________________________________________________________________________________    Hernandez If a phone consent is obtained, consent will be documented by using two health care professionals, each affirming that the consenting party has no questions and gives consent for the procedure discussed with the physician/provider.   _____________________          ____________________       _____/_____am/pm   2nd witness to phone consent        Printed name           Date / Time    Informed Consent:  I have provided the explanation described above in section 1 to the patient and/or legal representative.  I have provided the patient and/or legal representative with an opportunity to ask any questions about the proposed operation/procedure.   ___________________________          ____________________         ____/____am/pm  Provider / Proceduralist                            Printed name            Date / Time  Revised 8/2/2021                                                                      Page 2 of 2

## 2022-06-23 NOTE — PROGRESS NOTES
Abnormal labs from 6-8 faxed to surgeon. Pt states video was sent to daughters email and he will watch it before surgery.  [facility] Progress Note    Patient: Diego Sanders MRN: [id number]  SSN: [id number]    YOB: 1946  Age: 77 y.o. Sex: female      Admit Date: 5/14/2022    LOS: 15 days     Subjective:   Patient remains intubated and sedated  On low-dose pressors    Objective:     Vitals:    05/29/22 0500 05/29/22 0600 05/29/22 0700 05/29/22 0756   BP: (!) 144/88 (!) 140/61     Pulse: 81 84  82   Resp: 11 11  11   Temp:   97 °F (36.1 °C)    SpO2: 100%   100%   Weight:       Height:            Intake and Output:  Current Shift: No intake/output data recorded.   Last three shifts: 05/27 1901 - 05/29 0700  In: 3185.1 [I.V.:1020.1]  Out: 4300 [Urine:4250; Drains:50]    Review of Systems:  ROS     Physical Exam:   Sacral decubitus ulcer VAC dressing in place    Lab/Data Review:  Recent Results (from the past 24 hour(s))   GLUCOSE, POC    Collection Time: 05/28/22 12:00 PM   Result Value Ref Range    Glucose (POC) 158 (H) 65 - 117 mg/dL    Performed by Steve Osorio    GLUCOSE, POC    Collection Time: 05/28/22  5:56 PM   Result Value Ref Range    Glucose (POC) 173 (H) 65 - 117 mg/dL    Performed by Steve Osorio    GLUCOSE, POC    Collection Time: 05/29/22 12:20 AM   Result Value Ref Range    Glucose (POC) 159 (H) 65 - 117 mg/dL    Performed by Antoinette Alexander    BLOOD GAS, ARTERIAL    Collection Time: 05/29/22  3:30 AM   Result Value Ref Range    pH 7.40 7.35 - 7.45      PCO2 29 (L) 35 - 45 mmHg    PO2 88 75 - 100 mmHg    O2 SAT 97 >95 %    BICARBONATE 20 (L) 22 - 26 mmol/L    BASE DEFICIT 5.4 (H) 0 - 2 mmol/L    O2 METHOD VENT      FIO2 21 %    MODE Assist Control/Volume Control      Tidal volume 500      SET RATE 10      EPAP/CPAP/PEEP 5      Sample source Arterial     CBC WITH AUTOMATED DIFF    Collection Time: 05/29/22  3:40 AM   Result Value Ref Range    WBC 13.5 (H) 3.6 - 11.0 K/uL    RBC 2.92 (L) 3.80 - 5.20 M/uL    HGB 8.3 (L) 11.5 - 16.0 g/dL    HCT 25.7 (L) 35.0 - 47.0 %    MCV 88.0 80.0 - 99.0 FL    MCH 28.4 26.0 - 34.0 PG    MCHC 32.3 30.0 - 36.5 g/dL    RDW 17.1 (H) 11.5 - 14.5 %    PLATELET 919 159 - 437 K/uL    MPV 10.4 8.9 - 12.9 FL    NRBC 0.0 0.0  WBC    ABSOLUTE NRBC 0.00 0.00 - 0.01 K/uL    NEUTROPHILS 68 32 - 75 %    LYMPHOCYTES 21 12 - 49 %    MONOCYTES 6 5 - 13 %    EOSINOPHILS 3 0 - 7 %    BASOPHILS 1 0 - 1 %    IMMATURE GRANULOCYTES 1 (H) 0 - 0.5 %    ABS. NEUTROPHILS 9.2 (H) 1.8 - 8.0 K/UL    ABS. LYMPHOCYTES 2.8 0.8 - 3.5 K/UL    ABS. MONOCYTES 0.9 0.0 - 1.0 K/UL    ABS. EOSINOPHILS 0.4 0.0 - 0.4 K/UL    ABS. BASOPHILS 0.1 0.0 - 0.1 K/UL    ABS. IMM. GRANS. 0.1 (H) 0.00 - 0.04 K/UL    DF AUTOMATED     METABOLIC PANEL, COMPREHENSIVE    Collection Time: 05/29/22  3:40 AM   Result Value Ref Range    Sodium 144 136 - 145 mmol/L    Potassium 3.7 3.5 - 5.1 mmol/L    Chloride 116 (H) 97 - 108 mmol/L    CO2 22 21 - 32 mmol/L    Anion gap 6 5 - 15 mmol/L    Glucose 126 (H) 65 - 100 mg/dL    BUN 22 (H) 6 - 20 mg/dL    Creatinine 0.68 0.55 - 1.02 mg/dL    BUN/Creatinine ratio 32 (H) 12 - 20      GFR est AA >60 >60 ml/min/1.73m2    GFR est non-AA >60 >60 ml/min/1.73m2    Calcium 8.5 8.5 - 10.1 mg/dL    Bilirubin, total 0.4 0.2 - 1.0 mg/dL    AST (SGOT) 18 15 - 37 U/L    ALT (SGPT) 90 (H) 12 - 78 U/L    Alk. phosphatase 99 45 - 117 U/L    Protein, total 6.3 (L) 6.4 - 8.2 g/dL    Albumin 1.5 (L) 3.5 - 5.0 g/dL    Globulin 4.8 (H) 2.0 - 4.0 g/dL    A-G Ratio 0.3 (L) 1.1 - 2.2     C REACTIVE PROTEIN, QT    Collection Time: 05/29/22  3:40 AM   Result Value Ref Range    C-Reactive protein 11.00 (H) 0.00 - 0.60 mg/dL   PROCALCITONIN    Collection Time: 05/29/22  3:40 AM   Result Value Ref Range    Procalcitonin 0.31 (H) 0 ng/mL          Assessment:     Active Problems:    Sacral ulcer ([address] (5/14/2022)        Plan:   Discussed case with Dr. Sohail Khanna from anesthesia who feels the patient is still too unstable to proceed with abdominal surgery for diverting colostomy. Currently VAC therapy is holding.   Plan for VAC change on May 31. Continue to wean off pressors  Patient continues on Unasyn and vancomycin for Acinetobacter and Enterococcus sacral wound culture, Acinetobacter left foot wound culture.   Infectious disease following    Signed By: Tung Sorensen MD     May 29, 2022

## 2022-06-27 ENCOUNTER — ANESTHESIA EVENT (OUTPATIENT)
Dept: OPERATING ROOM | Age: 72
End: 2022-06-27
Payer: MEDICARE

## 2022-06-28 ENCOUNTER — ANESTHESIA (OUTPATIENT)
Dept: OPERATING ROOM | Age: 72
End: 2022-06-28
Payer: MEDICARE

## 2022-06-28 ENCOUNTER — HOSPITAL ENCOUNTER (OUTPATIENT)
Age: 72
Setting detail: OUTPATIENT SURGERY
Discharge: HOME | End: 2022-06-28
Attending: ORTHOPAEDIC SURGERY | Admitting: ORTHOPAEDIC SURGERY
Payer: MEDICARE

## 2022-06-28 VITALS
WEIGHT: 224 LBS | OXYGEN SATURATION: 93 % | HEIGHT: 70 IN | HEART RATE: 90 BPM | RESPIRATION RATE: 16 BRPM | SYSTOLIC BLOOD PRESSURE: 112 MMHG | DIASTOLIC BLOOD PRESSURE: 78 MMHG | TEMPERATURE: 97.8 F | BODY MASS INDEX: 32.07 KG/M2

## 2022-06-28 DIAGNOSIS — M17.11 ARTHRITIS OF RIGHT KNEE: Primary | ICD-10-CM

## 2022-06-28 PROBLEM — N40.0 BPH (BENIGN PROSTATIC HYPERPLASIA): Status: ACTIVE | Noted: 2022-06-28

## 2022-06-28 LAB
ABO/RH: NORMAL
ANTIBODY SCREEN: NORMAL
GLUCOSE BLD-MCNC: 119 MG/DL (ref 70–99)
GLUCOSE BLD-MCNC: 154 MG/DL (ref 70–99)
PERFORMED ON: ABNORMAL
PERFORMED ON: ABNORMAL

## 2022-06-28 PROCEDURE — A4217 STERILE WATER/SALINE, 500 ML: HCPCS | Performed by: ORTHOPAEDIC SURGERY

## 2022-06-28 PROCEDURE — 97166 OT EVAL MOD COMPLEX 45 MIN: CPT

## 2022-06-28 PROCEDURE — 86901 BLOOD TYPING SEROLOGIC RH(D): CPT

## 2022-06-28 PROCEDURE — 2720000010 HC SURG SUPPLY STERILE: Performed by: ORTHOPAEDIC SURGERY

## 2022-06-28 PROCEDURE — 6360000002 HC RX W HCPCS: Performed by: ORTHOPAEDIC SURGERY

## 2022-06-28 PROCEDURE — 7100000001 HC PACU RECOVERY - ADDTL 15 MIN: Performed by: ORTHOPAEDIC SURGERY

## 2022-06-28 PROCEDURE — C1776 JOINT DEVICE (IMPLANTABLE): HCPCS | Performed by: ORTHOPAEDIC SURGERY

## 2022-06-28 PROCEDURE — 7100000000 HC PACU RECOVERY - FIRST 15 MIN: Performed by: ORTHOPAEDIC SURGERY

## 2022-06-28 PROCEDURE — 6370000000 HC RX 637 (ALT 250 FOR IP): Performed by: ANESTHESIOLOGY

## 2022-06-28 PROCEDURE — 2709999900 HC NON-CHARGEABLE SUPPLY: Performed by: ORTHOPAEDIC SURGERY

## 2022-06-28 PROCEDURE — 7100000010 HC PHASE II RECOVERY - FIRST 15 MIN: Performed by: ORTHOPAEDIC SURGERY

## 2022-06-28 PROCEDURE — 3700000000 HC ANESTHESIA ATTENDED CARE: Performed by: ORTHOPAEDIC SURGERY

## 2022-06-28 PROCEDURE — 86900 BLOOD TYPING SEROLOGIC ABO: CPT

## 2022-06-28 PROCEDURE — 97116 GAIT TRAINING THERAPY: CPT

## 2022-06-28 PROCEDURE — 2580000003 HC RX 258: Performed by: ORTHOPAEDIC SURGERY

## 2022-06-28 PROCEDURE — 6360000002 HC RX W HCPCS: Performed by: ANESTHESIOLOGY

## 2022-06-28 PROCEDURE — 2500000003 HC RX 250 WO HCPCS: Performed by: ANESTHESIOLOGY

## 2022-06-28 PROCEDURE — 2580000003 HC RX 258: Performed by: ANESTHESIOLOGY

## 2022-06-28 PROCEDURE — C1713 ANCHOR/SCREW BN/BN,TIS/BN: HCPCS | Performed by: ORTHOPAEDIC SURGERY

## 2022-06-28 PROCEDURE — 64447 NJX AA&/STRD FEMORAL NRV IMG: CPT | Performed by: ANESTHESIOLOGY

## 2022-06-28 PROCEDURE — 97161 PT EVAL LOW COMPLEX 20 MIN: CPT

## 2022-06-28 PROCEDURE — 3600000005 HC SURGERY LEVEL 5 BASE: Performed by: ORTHOPAEDIC SURGERY

## 2022-06-28 PROCEDURE — 86850 RBC ANTIBODY SCREEN: CPT

## 2022-06-28 PROCEDURE — 3700000001 HC ADD 15 MINUTES (ANESTHESIA): Performed by: ORTHOPAEDIC SURGERY

## 2022-06-28 PROCEDURE — 97530 THERAPEUTIC ACTIVITIES: CPT

## 2022-06-28 PROCEDURE — 6370000000 HC RX 637 (ALT 250 FOR IP): Performed by: ORTHOPAEDIC SURGERY

## 2022-06-28 PROCEDURE — 94640 AIRWAY INHALATION TREATMENT: CPT

## 2022-06-28 PROCEDURE — 3600000015 HC SURGERY LEVEL 5 ADDTL 15MIN: Performed by: ORTHOPAEDIC SURGERY

## 2022-06-28 PROCEDURE — 2500000003 HC RX 250 WO HCPCS: Performed by: ORTHOPAEDIC SURGERY

## 2022-06-28 PROCEDURE — 97535 SELF CARE MNGMENT TRAINING: CPT

## 2022-06-28 PROCEDURE — 7100000011 HC PHASE II RECOVERY - ADDTL 15 MIN: Performed by: ORTHOPAEDIC SURGERY

## 2022-06-28 DEVICE — CEMENT BNE 20ML 41GM FULL DOSE PMMA W/ TOBRA M VISC RADPQ: Type: IMPLANTABLE DEVICE | Site: KNEE | Status: FUNCTIONAL

## 2022-06-28 DEVICE — KNEE K1 TOT HEMI STD CEM IMPL CAPPED SYNTHES: Type: IMPLANTABLE DEVICE | Site: KNEE | Status: FUNCTIONAL

## 2022-06-28 DEVICE — COMPONENT PAT DIA38MM POLYETH DOME CEM MEDIALIZED ATTUNE: Type: IMPLANTABLE DEVICE | Site: KNEE | Status: FUNCTIONAL

## 2022-06-28 DEVICE — BASEPLATE TIB SZ 7 FIX BEAR CEM ATTUNE: Type: IMPLANTABLE DEVICE | Site: KNEE | Status: FUNCTIONAL

## 2022-06-28 DEVICE — IMPLANTABLE DEVICE: Type: IMPLANTABLE DEVICE | Site: KNEE | Status: FUNCTIONAL

## 2022-06-28 RX ORDER — SODIUM CHLORIDE 0.9 % (FLUSH) 0.9 %
5-40 SYRINGE (ML) INJECTION PRN
Status: DISCONTINUED | OUTPATIENT
Start: 2022-06-28 | End: 2022-06-28 | Stop reason: HOSPADM

## 2022-06-28 RX ORDER — SODIUM CHLORIDE 9 MG/ML
INJECTION, SOLUTION INTRAVENOUS PRN
Status: DISCONTINUED | OUTPATIENT
Start: 2022-06-28 | End: 2022-06-28 | Stop reason: HOSPADM

## 2022-06-28 RX ORDER — DEXAMETHASONE SODIUM PHOSPHATE 10 MG/ML
10 INJECTION, SOLUTION INTRAMUSCULAR; INTRAVENOUS ONCE
Status: COMPLETED | OUTPATIENT
Start: 2022-06-28 | End: 2022-06-28

## 2022-06-28 RX ORDER — NEOSTIGMINE METHYLSULFATE 5 MG/5 ML
SYRINGE (ML) INTRAVENOUS PRN
Status: DISCONTINUED | OUTPATIENT
Start: 2022-06-28 | End: 2022-06-28 | Stop reason: SDUPTHER

## 2022-06-28 RX ORDER — PHENYLEPHRINE HYDROCHLORIDE 10 MG/ML
INJECTION INTRAVENOUS PRN
Status: DISCONTINUED | OUTPATIENT
Start: 2022-06-28 | End: 2022-06-28 | Stop reason: SDUPTHER

## 2022-06-28 RX ORDER — IPRATROPIUM BROMIDE AND ALBUTEROL SULFATE 2.5; .5 MG/3ML; MG/3ML
1 SOLUTION RESPIRATORY (INHALATION) ONCE
Status: COMPLETED | OUTPATIENT
Start: 2022-06-28 | End: 2022-06-28

## 2022-06-28 RX ORDER — FENTANYL CITRATE 50 UG/ML
25 INJECTION, SOLUTION INTRAMUSCULAR; INTRAVENOUS EVERY 5 MIN PRN
Status: DISCONTINUED | OUTPATIENT
Start: 2022-06-28 | End: 2022-06-28 | Stop reason: HOSPADM

## 2022-06-28 RX ORDER — GABAPENTIN 300 MG/1
300 CAPSULE ORAL ONCE
Status: COMPLETED | OUTPATIENT
Start: 2022-06-28 | End: 2022-06-28

## 2022-06-28 RX ORDER — FAMOTIDINE 10 MG/ML
INJECTION, SOLUTION INTRAVENOUS PRN
Status: DISCONTINUED | OUTPATIENT
Start: 2022-06-28 | End: 2022-06-28 | Stop reason: SDUPTHER

## 2022-06-28 RX ORDER — HYDRALAZINE HYDROCHLORIDE 20 MG/ML
10 INJECTION INTRAMUSCULAR; INTRAVENOUS
Status: DISCONTINUED | OUTPATIENT
Start: 2022-06-28 | End: 2022-06-28 | Stop reason: HOSPADM

## 2022-06-28 RX ORDER — VANCOMYCIN HYDROCHLORIDE 1 G/20ML
INJECTION, POWDER, LYOPHILIZED, FOR SOLUTION INTRAVENOUS PRN
Status: DISCONTINUED | OUTPATIENT
Start: 2022-06-28 | End: 2022-06-28 | Stop reason: HOSPADM

## 2022-06-28 RX ORDER — MEPERIDINE HYDROCHLORIDE 25 MG/ML
12.5 INJECTION INTRAMUSCULAR; INTRAVENOUS; SUBCUTANEOUS EVERY 5 MIN PRN
Status: DISCONTINUED | OUTPATIENT
Start: 2022-06-28 | End: 2022-06-28 | Stop reason: HOSPADM

## 2022-06-28 RX ORDER — FENTANYL CITRATE 50 UG/ML
INJECTION, SOLUTION INTRAMUSCULAR; INTRAVENOUS
Status: COMPLETED
Start: 2022-06-28 | End: 2022-06-28

## 2022-06-28 RX ORDER — BUPIVACAINE HYDROCHLORIDE 5 MG/ML
INJECTION, SOLUTION EPIDURAL; INTRACAUDAL
Status: COMPLETED
Start: 2022-06-28 | End: 2022-06-28

## 2022-06-28 RX ORDER — ONDANSETRON 4 MG/1
4 TABLET, FILM COATED ORAL EVERY 8 HOURS PRN
Qty: 15 TABLET | Refills: 0 | Status: SHIPPED | OUTPATIENT
Start: 2022-06-28

## 2022-06-28 RX ORDER — FENTANYL CITRATE 50 UG/ML
INJECTION, SOLUTION INTRAMUSCULAR; INTRAVENOUS PRN
Status: DISCONTINUED | OUTPATIENT
Start: 2022-06-28 | End: 2022-06-28 | Stop reason: SDUPTHER

## 2022-06-28 RX ORDER — ROCURONIUM BROMIDE 10 MG/ML
INJECTION, SOLUTION INTRAVENOUS PRN
Status: DISCONTINUED | OUTPATIENT
Start: 2022-06-28 | End: 2022-06-28 | Stop reason: SDUPTHER

## 2022-06-28 RX ORDER — ONDANSETRON 2 MG/ML
4 INJECTION INTRAMUSCULAR; INTRAVENOUS
Status: DISCONTINUED | OUTPATIENT
Start: 2022-06-28 | End: 2022-06-28 | Stop reason: HOSPADM

## 2022-06-28 RX ORDER — METOPROLOL TARTRATE 5 MG/5ML
INJECTION INTRAVENOUS PRN
Status: DISCONTINUED | OUTPATIENT
Start: 2022-06-28 | End: 2022-06-28 | Stop reason: SDUPTHER

## 2022-06-28 RX ORDER — SODIUM CHLORIDE, SODIUM LACTATE, POTASSIUM CHLORIDE, CALCIUM CHLORIDE 600; 310; 30; 20 MG/100ML; MG/100ML; MG/100ML; MG/100ML
INJECTION, SOLUTION INTRAVENOUS CONTINUOUS
Status: DISCONTINUED | OUTPATIENT
Start: 2022-06-28 | End: 2022-06-28 | Stop reason: HOSPADM

## 2022-06-28 RX ORDER — M-VIT,TX,IRON,MINS/CALC/FOLIC 27MG-0.4MG
1 TABLET ORAL DAILY
COMMUNITY

## 2022-06-28 RX ORDER — MIDAZOLAM HYDROCHLORIDE 1 MG/ML
INJECTION INTRAMUSCULAR; INTRAVENOUS
Status: COMPLETED
Start: 2022-06-28 | End: 2022-06-28

## 2022-06-28 RX ORDER — BUPIVACAINE HYDROCHLORIDE 5 MG/ML
INJECTION, SOLUTION EPIDURAL; INTRACAUDAL PRN
Status: DISCONTINUED | OUTPATIENT
Start: 2022-06-28 | End: 2022-06-28 | Stop reason: SDUPTHER

## 2022-06-28 RX ORDER — KETOROLAC TROMETHAMINE 30 MG/ML
INJECTION, SOLUTION INTRAMUSCULAR; INTRAVENOUS PRN
Status: DISCONTINUED | OUTPATIENT
Start: 2022-06-28 | End: 2022-06-28 | Stop reason: SDUPTHER

## 2022-06-28 RX ORDER — CELECOXIB 200 MG/1
200 CAPSULE ORAL DAILY
Qty: 30 CAPSULE | Refills: 3 | Status: SHIPPED | OUTPATIENT
Start: 2022-06-28

## 2022-06-28 RX ORDER — ASPIRIN 325 MG
325 TABLET, DELAYED RELEASE (ENTERIC COATED) ORAL DAILY
Qty: 30 TABLET | Refills: 0 | Status: SHIPPED | OUTPATIENT
Start: 2022-06-28 | End: 2022-07-28

## 2022-06-28 RX ORDER — ACETAMINOPHEN 500 MG
1000 TABLET ORAL ONCE
Status: COMPLETED | OUTPATIENT
Start: 2022-06-28 | End: 2022-06-28

## 2022-06-28 RX ORDER — HYDROMORPHONE HCL 110MG/55ML
PATIENT CONTROLLED ANALGESIA SYRINGE INTRAVENOUS PRN
Status: DISCONTINUED | OUTPATIENT
Start: 2022-06-28 | End: 2022-06-28 | Stop reason: SDUPTHER

## 2022-06-28 RX ORDER — SODIUM CHLORIDE 0.9 % (FLUSH) 0.9 %
5-40 SYRINGE (ML) INJECTION EVERY 12 HOURS SCHEDULED
Status: DISCONTINUED | OUTPATIENT
Start: 2022-06-28 | End: 2022-06-28 | Stop reason: HOSPADM

## 2022-06-28 RX ORDER — MAGNESIUM HYDROXIDE 1200 MG/15ML
LIQUID ORAL CONTINUOUS PRN
Status: DISCONTINUED | OUTPATIENT
Start: 2022-06-28 | End: 2022-06-28 | Stop reason: HOSPADM

## 2022-06-28 RX ORDER — SENNA PLUS 8.6 MG/1
1 TABLET ORAL 2 TIMES DAILY PRN
Qty: 20 TABLET | Refills: 0 | Status: SHIPPED | OUTPATIENT
Start: 2022-06-28 | End: 2022-07-12

## 2022-06-28 RX ORDER — ACETAMINOPHEN 325 MG/1
650 TABLET ORAL
Status: DISCONTINUED | OUTPATIENT
Start: 2022-06-28 | End: 2022-06-28 | Stop reason: HOSPADM

## 2022-06-28 RX ORDER — GLYCOPYRROLATE 1 MG/5 ML
SYRINGE (ML) INTRAVENOUS PRN
Status: DISCONTINUED | OUTPATIENT
Start: 2022-06-28 | End: 2022-06-28 | Stop reason: SDUPTHER

## 2022-06-28 RX ORDER — PROPOFOL 10 MG/ML
INJECTION, EMULSION INTRAVENOUS PRN
Status: DISCONTINUED | OUTPATIENT
Start: 2022-06-28 | End: 2022-06-28 | Stop reason: SDUPTHER

## 2022-06-28 RX ORDER — PROCHLORPERAZINE EDISYLATE 5 MG/ML
5 INJECTION INTRAMUSCULAR; INTRAVENOUS
Status: DISCONTINUED | OUTPATIENT
Start: 2022-06-28 | End: 2022-06-28 | Stop reason: HOSPADM

## 2022-06-28 RX ORDER — OXYCODONE HYDROCHLORIDE AND ACETAMINOPHEN 5; 325 MG/1; MG/1
1 TABLET ORAL EVERY 6 HOURS PRN
Qty: 28 TABLET | Refills: 0 | Status: SHIPPED | OUTPATIENT
Start: 2022-06-28 | End: 2022-07-05

## 2022-06-28 RX ORDER — MIDAZOLAM HYDROCHLORIDE 1 MG/ML
INJECTION INTRAMUSCULAR; INTRAVENOUS PRN
Status: DISCONTINUED | OUTPATIENT
Start: 2022-06-28 | End: 2022-06-28 | Stop reason: SDUPTHER

## 2022-06-28 RX ORDER — EPHEDRINE SULFATE 50 MG/ML
INJECTION INTRAVENOUS PRN
Status: DISCONTINUED | OUTPATIENT
Start: 2022-06-28 | End: 2022-06-28 | Stop reason: SDUPTHER

## 2022-06-28 RX ORDER — LABETALOL HYDROCHLORIDE 5 MG/ML
10 INJECTION, SOLUTION INTRAVENOUS
Status: DISCONTINUED | OUTPATIENT
Start: 2022-06-28 | End: 2022-06-28 | Stop reason: HOSPADM

## 2022-06-28 RX ADMIN — HYDROMORPHONE HYDROCHLORIDE 1 MG: 2 INJECTION, SOLUTION INTRAMUSCULAR; INTRAVENOUS; SUBCUTANEOUS at 12:25

## 2022-06-28 RX ADMIN — MIDAZOLAM HYDROCHLORIDE 2 MG: 2 INJECTION, SOLUTION INTRAMUSCULAR; INTRAVENOUS at 11:00

## 2022-06-28 RX ADMIN — HYDROMORPHONE HYDROCHLORIDE 1 MG: 2 INJECTION, SOLUTION INTRAMUSCULAR; INTRAVENOUS; SUBCUTANEOUS at 12:20

## 2022-06-28 RX ADMIN — METOPROLOL TARTRATE 3 MG: 5 INJECTION INTRAVENOUS at 12:27

## 2022-06-28 RX ADMIN — DEXAMETHASONE SODIUM PHOSPHATE 10 MG: 10 INJECTION, SOLUTION INTRAMUSCULAR; INTRAVENOUS at 10:27

## 2022-06-28 RX ADMIN — FENTANYL CITRATE 100 MCG: 50 INJECTION, SOLUTION INTRAMUSCULAR; INTRAVENOUS at 11:00

## 2022-06-28 RX ADMIN — SODIUM CHLORIDE, POTASSIUM CHLORIDE, SODIUM LACTATE AND CALCIUM CHLORIDE: 600; 310; 30; 20 INJECTION, SOLUTION INTRAVENOUS at 10:20

## 2022-06-28 RX ADMIN — ACETAMINOPHEN 1000 MG: 500 TABLET ORAL at 10:06

## 2022-06-28 RX ADMIN — Medication 0.6 MG: at 14:18

## 2022-06-28 RX ADMIN — BUPIVACAINE HYDROCHLORIDE 30 ML: 5 INJECTION, SOLUTION EPIDURAL; INTRACAUDAL; PERINEURAL at 11:00

## 2022-06-28 RX ADMIN — FAMOTIDINE 20 MG: 10 INJECTION, SOLUTION INTRAVENOUS at 12:27

## 2022-06-28 RX ADMIN — PHENYLEPHRINE HYDROCHLORIDE 100 MCG: 10 INJECTION INTRAVENOUS at 13:27

## 2022-06-28 RX ADMIN — IPRATROPIUM BROMIDE AND ALBUTEROL SULFATE 1 AMPULE: 2.5; .5 SOLUTION RESPIRATORY (INHALATION) at 10:20

## 2022-06-28 RX ADMIN — EPHEDRINE SULFATE 25 MG: 50 INJECTION INTRAVENOUS at 13:22

## 2022-06-28 RX ADMIN — ROCURONIUM BROMIDE 50 MG: 10 INJECTION INTRAVENOUS at 11:40

## 2022-06-28 RX ADMIN — EPHEDRINE SULFATE 25 MG: 50 INJECTION INTRAVENOUS at 11:58

## 2022-06-28 RX ADMIN — PHENYLEPHRINE HYDROCHLORIDE 100 MCG: 10 INJECTION INTRAVENOUS at 13:25

## 2022-06-28 RX ADMIN — VANCOMYCIN HYDROCHLORIDE 1500 MG: 10 INJECTION, POWDER, LYOPHILIZED, FOR SOLUTION INTRAVENOUS at 10:35

## 2022-06-28 RX ADMIN — METOPROLOL TARTRATE 2 MG: 5 INJECTION INTRAVENOUS at 12:42

## 2022-06-28 RX ADMIN — TRANEXAMIC ACID 1000 MG: 100 INJECTION INTRAVENOUS at 13:17

## 2022-06-28 RX ADMIN — PHENYLEPHRINE HYDROCHLORIDE 100 MCG: 10 INJECTION INTRAVENOUS at 13:30

## 2022-06-28 RX ADMIN — PROPOFOL 150 MG: 10 INJECTION, EMULSION INTRAVENOUS at 11:40

## 2022-06-28 RX ADMIN — GABAPENTIN 300 MG: 300 CAPSULE ORAL at 10:06

## 2022-06-28 RX ADMIN — SODIUM CHLORIDE, POTASSIUM CHLORIDE, SODIUM LACTATE AND CALCIUM CHLORIDE: 600; 310; 30; 20 INJECTION, SOLUTION INTRAVENOUS at 13:27

## 2022-06-28 RX ADMIN — KETOROLAC TROMETHAMINE 30 MG: 30 INJECTION, SOLUTION INTRAMUSCULAR at 14:08

## 2022-06-28 RX ADMIN — CEFAZOLIN 2000 MG: 2 INJECTION, POWDER, FOR SOLUTION INTRAMUSCULAR; INTRAVENOUS at 11:51

## 2022-06-28 RX ADMIN — Medication 3 MG: at 14:18

## 2022-06-28 RX ADMIN — FENTANYL CITRATE 100 MCG: 50 INJECTION, SOLUTION INTRAMUSCULAR; INTRAVENOUS at 11:40

## 2022-06-28 RX ADMIN — PHENYLEPHRINE HYDROCHLORIDE 100 MCG: 10 INJECTION INTRAVENOUS at 13:48

## 2022-06-28 ASSESSMENT — PAIN SCALES - GENERAL
PAINLEVEL_OUTOF10: 2
PAINLEVEL_OUTOF10: 0
PAINLEVEL_OUTOF10: 3
PAINLEVEL_OUTOF10: 0

## 2022-06-28 ASSESSMENT — PAIN DESCRIPTION - ORIENTATION
ORIENTATION: RIGHT
ORIENTATION: RIGHT

## 2022-06-28 ASSESSMENT — PAIN DESCRIPTION - DESCRIPTORS
DESCRIPTORS: DISCOMFORT
DESCRIPTORS: DISCOMFORT

## 2022-06-28 ASSESSMENT — PAIN DESCRIPTION - PAIN TYPE
TYPE: SURGICAL PAIN
TYPE: SURGICAL PAIN

## 2022-06-28 ASSESSMENT — LIFESTYLE VARIABLES: SMOKING_STATUS: 1

## 2022-06-28 ASSESSMENT — ENCOUNTER SYMPTOMS: DYSPNEA ACTIVITY LEVEL: NO INTERVAL CHANGE

## 2022-06-28 ASSESSMENT — PAIN DESCRIPTION - LOCATION
LOCATION: KNEE
LOCATION: KNEE

## 2022-06-28 ASSESSMENT — PAIN DESCRIPTION - FREQUENCY
FREQUENCY: CONTINUOUS
FREQUENCY: CONTINUOUS

## 2022-06-28 ASSESSMENT — PAIN DESCRIPTION - ONSET: ONSET: ON-GOING

## 2022-06-28 NOTE — ANESTHESIA PRE PROCEDURE
Department of Anesthesiology  Preprocedure Note       Name:  Tin Rojas   Age:  71 y.o.  :  1950                                          MRN:  [id number]         Date:  2022      Surgeon: Pablo Kennedy): Lee Lerner MD    Procedure: Procedure(s):  RIGHT TOTAL KNEE RPELACEMENT    Medications prior to admission:   Prior to Admission medications    Medication Sig Start Date End Date Taking? Authorizing Provider   metFORMIN (GLUCOPHAGE) 500 MG tablet Take 1,000 mg by mouth at bedtime   Yes Historical Provider, MD   Multiple Vitamins-Minerals (THERAPEUTIC MULTIVITAMIN-MINERALS) tablet Take 1 tablet by mouth daily   Yes Historical Provider, MD   glipiZIDE (GLUCOTROL) 5 MG tablet TAKE 1 TABLET BY MOUTH EVERY DAY 6/10/22   Historical Provider, MD   testosterone cypionate (DEPOTESTOTERONE CYPIONATE) 200 MG/ML injection INJECT 1.5ML INTO THE MUSCLE EVERY 14 DAYS 19   Historical Provider, MD   ONE TOUCH ULTRA TEST strip use to test blood sugar once daily 18   Historical Provider, MD   aspirin 81 MG chewable tablet Take 81 mg by mouth daily  8/15/17   Historical Provider, MD   atorvastatin (LIPITOR) 40 MG tablet TAKE 1 TABLET BY MOUTH ONE TIME A DAY 18   Historical Provider, MD   Lancets [facility].  [address] [facility] [address] by Other route 18   Historical Provider, MD   lisinopril (PRINIVIL;ZESTRIL) 5 MG tablet 10 mg  18   Historical Provider, MD   meloxicam (MOBIC) 15 MG tablet Take 15 mg by mouth  Patient not taking: Reported on 2022 10/16/18   Historical Provider, MD   metFORMIN (GLUCOPHAGE) 500 MG tablet 1,000 mg at bedtime 500 mg in the morning and 1000 mg at night 18   Historical Provider, MD       Current medications:    Current Facility-Administered Medications   Medication Dose Route Frequency Provider Last Rate Last Admin    ceFAZolin (ANCEF) 2,000 mg in sodium chloride 0.9 % 50 mL IVPB (mini-bag)  2,000 mg IntraVENous Once Lee Lerner MD        vancomycin (VANCOCIN) 1,500 mg in dextrose 5 % 250 mL IVPB  15 mg/kg IntraVENous Once Philipp Borges MD        dexamethasone (PF) (DECADRON) injection 10 mg  10 mg IntraVENous Once Philipp Borges MD        ortho mix (with morphine) injection   Injection On Call Philipp Borges MD        tranexamic acid (CYKLOKAPRON) 1,000 mg in sodium chloride 0.9 % 60 mL IVPB  1,000 mg IntraVENous Once Philipp Borges MD        tranexamic acid (CYKLOKAPRON) 1,000 mg in sodium chloride 0.9 % 60 mL IVPB  1,000 mg IntraVENous Once Philipp Borges MD        lactated ringers infusion   IntraVENous Continuous Nell Alberts DO        lactated ringers infusion   IntraVENous Continuous Luigi Pino MD        sodium chloride flush 0.9 % injection 5-40 mL  5-40 mL IntraVENous 2 times per day Luigi Pino MD        sodium chloride flush 0.9 % injection 5-40 mL  5-40 mL IntraVENous PRN Luigi Pino MD        0.9 % sodium chloride infusion   IntraVENous PRN Luigi Pino MD        ipratropium-albuterol (DUONEB) nebulizer solution 1 ampule  1 ampule Inhalation Once Marilu Alcaraz DO           Allergies: Allergies   Allergen Reactions    No Known Allergies        Problem List:    Patient Active Problem List   Diagnosis Code    Hypogonadism male E29.1    Tobacco abuse Z72.0    Type 2 diabetes mellitus without complication, without long-term current use of insulin ([facility]) E11.9       Past Medical History:        Diagnosis Date    Arthritis     Diabetes [address] [address]        Past Surgical History:        Procedure Laterality Date    HERNIA REPAIR      as a child    TONSILLECTOMY         Social History:    Social History     Tobacco Use    Smoking status: Current Every Day Smoker     Packs/day: 2.00     Types: Cigarettes    Smokeless tobacco: Never Used   Substance Use Topics    Alcohol use:  No                                Ready to quit: Not Answered  Counseling given: Not Answered      Vital Signs (Current):   Vitals: 06/23/22 1545 06/28/22 0933   BP:  (!) 136/93   Pulse:  79   Resp:  16   Temp:  97.8 °F (36.6 °C)   TempSrc:  Temporal   SpO2:  98%   Weight: 224 lb (101.6 kg) 224 lb (101.6 kg)   Height: 5' 10\" (1.778 m) 5' 10\" (1.778 m)                                              BP Readings from Last 3 Encounters:   06/28/22 (!) 136/93   11/25/19 122/73   04/22/19 132/85       NPO Status: Time of last liquid consumption: 2100                        Time of last solid consumption: 1900                        Date of last liquid consumption: 06/27/22                        Date of last solid food consumption: 06/27/22    BMI:   Wt Readings from Last 3 Encounters:   06/28/22 224 lb (101.6 kg)   06/13/22 222 lb (100.7 kg)   03/28/22 222 lb (100.7 kg)     Body mass index is 32.14 kg/m². CBC: No results found for: WBC, RBC, HGB, HCT, MCV, RDW, PLT    CMP: No results found for: NA, K, CL, CO2, BUN, CREATININE, GFRAA, AGRATIO, LABGLOM, GLUCOSE, GLU, PROT, CALCIUM, BILITOT, ALKPHOS, AST, ALT    POC Tests: No results for input(s): POCGLU, POCNA, POCK, POCCL, POCBUN, POCHEMO, POCHCT in the last 72 hours. Coags: No results found for: PROTIME, INR, APTT    HCG (If Applicable): No results found for: PREGTESTUR, PREGSERUM, HCG, HCGQUANT     ABGs: No results found for: PHART, PO2ART, RYF2IXQ, WYY7BON, BEART, W7NCBXGH     Type & Screen (If Applicable):  No results found for: LABABO, LABRH    Drug/Infectious Status (If Applicable):  No results found for: HIV, HEPCAB    COVID-19 Screening (If Applicable): No results found for: COVID19        Anesthesia Evaluation  Patient summary reviewed and Nursing notes reviewed no history of anesthetic complications:   Airway: Mallampati: III  TM distance: >3 FB   Neck ROM: full  Mouth opening: > = 3 FB   Dental:    (+) edentulous      Pulmonary:   (+) COPD:  decreased breath sounds: bilateral wheezes: scattered current smoker          Patient smoked on day of surgery. Cardiovascular:Negative CV ROS  Exercise tolerance: good (>4 METS),   (+) BEE: no interval change,         Rhythm: regular  Rate: normal                    Neuro/Psych:   Negative Neuro/Psych ROS              GI/Hepatic/Renal: Neg GI/Hepatic/Renal ROS            Endo/Other:    (+) DiabetesType II DM, , : arthritis:., .                 Abdominal:   (+) obese,     Abdomen: soft. Vascular: negative vascular ROS. Other Findings:           Anesthesia Plan      general     ASA 2       Induction: intravenous. MIPS: Postoperative opioids intended. Anesthetic plan and risks discussed with patient. Plan discussed with CRNA and attending.     Attending anesthesiologist reviewed and agrees with Preprocedure content      Post-op pain plan if not by surgeon: single peripheral nerve block            Aleena Osullivan DO   6/28/2022

## 2022-06-28 NOTE — PROGRESS NOTES
Ambulatory Surgery/Procedure Discharge Note    Vitals:    06/28/22 1740   BP: 112/78   Pulse: 90   Resp: 16   Temp: 97.8 °F (36.6 °C)   SpO2: 93%       No intake/output data recorded. Restroom use offered before discharge. Yes    Pain assessment:  level of pain (1-10, 10 severe),   Pain Level: 2     Pt alert and oriented x4. IV removed per protocol. Denies N/V. Drsg to RLE C, D, & I with ice pack and elevated wtih <3sec cap refill and +2palp pedal pulse. Discharge instructions given to pt and daughters with pt permission. Pt and daughters verbalized understanding of all instructions. Pt and daughters states \"ready to go home\". Left with all belongings, prescriptions, and discharge instructions. Patient discharged to home/self care.  Patient discharged via wheel chair to waiting daughters

## 2022-06-28 NOTE — PROGRESS NOTES
Occupational Therapy  Facility/Department: [address] [place] [address]  Occupational Therapy Initial Assessment, Treatment and D/c Note     Name: Kat Willingham  : 1950  MRN: [id number]  Date of Service: 2022    Discharge Recommendations:Franc Ham scored a 23/24 on the AM-PAC ADL Inpatient form. At this time, no further OT is recommended upon discharge. .  Recommend patient returns to prior setting with prior services. OT Equipment Recommendations  Equipment Needed: No       Patient Diagnosis(es): The encounter diagnosis was Arthritis of right knee. Past Medical History:  has a past medical history of Arthritis, BPH (benign prostatic hyperplasia), Diabetes ([address] DJD (degenerative joint disease) of knee, and Tobacco abuse. Past Surgical History:  has a past surgical history that includes Tonsillectomy and hernia repair. Assessment   Assessment: Pt from home with daughter. Pt doing well POD#0, Pt demo functional mobility / transfers with SBA w/ walker and SBA for LE ADLs. Pt edu on home safety / having daughter assist / supervise at d/c. No ongoing acute OT needs. No DME needs. Will sign off from OT services. Prognosis: Good  Decision Making: Medium Complexity  No Skilled OT: Safe to return home; No OT goals identified  REQUIRES OT FOLLOW-UP: No  Activity Tolerance  Activity Tolerance: Patient Tolerated treatment well  Activity Tolerance Comments: pt c/o occasional dizziness \" I feel a little drunk\"  No BEE noted. Plan D/c Acute OT services         Restrictions  Position Activity Restriction  Other position/activity restrictions: WBAT RLE per MD orders    Subjective   General  Chart Reviewed:  Yes  Additional Pertinent Hx: Presents to [facility] this am for R TKA                           PMHX:DM , HTN, OA  Family / Caregiver Present: No  Diagnosis: s/p  R TKA  Subjective  Subjective: \"I feel pretty good \" pt in bed agreeable for OOB/OT eval and tx     Social/Functional History  Social/Functional History  Lives With: Daughter  Type of Home: Trailer  Home Layout: One level  Home Access: Stairs to enter without rails  Entrance Stairs - Number of Steps: 3  Bathroom Shower/Tub: Tub/Shower unit  Bathroom Toilet: Standard (sink for leverage)  Has the patient had two or more falls in the past year or any fall with injury in the past year?: No  ADL Assistance: Independent  Homemaking Assistance: Independent (shares with daughter)  Homemaking Responsibilities: Yes  Ambulation Assistance: Independent  Transfer Assistance: Independent  Active : Yes  Occupation: Full time employment  Type of Occupation:  6 days a week       Objective  Treatment included functional transfer training, ADL's and pt. education. Safety Devices  Type of Devices: Left in bed;Nurse notified;Call light within reach     Toilet Transfers  Toilet - Technique: Ambulating  Equipment Used: Standard toilet  Toilet Transfer: Stand by assistance  Toilet Transfers Comments: with rail -- pt has sink for leverage  AROM: Within functional limits  Strength: Within functional limits  Coordination: Within functional limits  Tone: Normal  Sensation: Intact  ADL  Feeding: Independent  Grooming: Independent  LE Dressing: Stand by assistance  LE Dressing Skilled Clinical Factors: steadying assist in stance for clothing management  Toileting: Stand by assistance     Transfers  Sit to stand: Stand by assistance  Stand to sit: Stand by assistance  Vision - Basic Assessment  Prior Vision: Wears glasses only for reading  Cognition  Overall Cognitive Status: WFL  Cognition Comment: sleepy at times  Orientation  Overall Orientation Status: Within Functional Limits    Education Given To: Patient  Education Provided: Role of Therapy;Plan of Care;IADL Safety; ADL Adaptive Strategies;Transfer Training  Education Method: Demonstration;Verbal  Barriers to Learning: None  Education Outcome: Verbalized understanding;Demonstrated understanding    AM-PAC Score  AM-PAC Inpatient Daily Activity Raw Score: 23 (06/28/22 1725)  AM-PAC Inpatient ADL T-Scale Score : 51.12 (06/28/22 1725)  ADL Inpatient CMS 0-100% Score: 15.86 (06/28/22 1725)  ADL Inpatient CMS G-Code Modifier : CI (06/28/22 1725)    Therapy Time   Individual Concurrent Group Co-treatment   Time In 1640         Time Out 1719         Minutes 39            Timed Code Treatment Minutes:   24 mins    Total Treatment Minutes:  39 mins     Simona Carrizales, OT

## 2022-06-28 NOTE — ANESTHESIA POSTPROCEDURE EVALUATION
Department of Anesthesiology  Postprocedure Note    Patient: Laura Payan  MRN: [id number]  YOB: 1950  Date of evaluation: 6/28/2022      Procedure Summary     Date: 06/28/22 Room / Location: [facility] OR [address] [place] [address]    Anesthesia Start: 9701 Anesthesia Stop: 8708    Procedure: RIGHT TOTAL KNEE RPELACEMENT (Right Knee) Diagnosis:       Primary osteoarthritis of right knee      (Primary osteoarthritis of right knee [M17.11])    Surgeons: Francy Dempsey MD Responsible Provider: Maxi Hercules DO    Anesthesia Type: general ASA Status: 2          Anesthesia Type: No value filed.     Shahnaz Phase I: Shahnaz Score: 10    Shahnaz Phase II:        Anesthesia Post Evaluation    Patient location during evaluation: PACU  Patient participation: complete - patient participated  Level of consciousness: awake and alert  Airway patency: patent  Nausea & Vomiting: no nausea and no vomiting  Cardiovascular status: blood pressure returned to baseline  Respiratory status: acceptable  Hydration status: euvolemic

## 2022-06-28 NOTE — PROGRESS NOTES
PACU Transfer to [facility] #4    Procedure(s):  RIGHT TOTAL KNEE RPELACEMENT    Pt's Current Allergies: No known allergies    Pt meets criteria to transfer to next phase of care per DEE SCORE and ASPAN standards    Recent Labs     06/28/22  1024 06/28/22  1433   POCGLU 119* 154*       Vitals:    06/28/22 1730   BP: 111/75   Pulse: 94   Resp: 17   Temp: 98.1 °F (36.7 °C)   SpO2: 93%        Intake/Output Summary (Last 24 hours) at 6/28/2022 1739  Last data filed at 6/28/2022 1654  Gross per 24 hour   Intake 1815 ml   Output --   Net 1815 ml     Drank pepsi  Ate crackers    Pain assessment:  none  Pain Level: 3    Patient was assessed for unknown alterations to skin integrity. There were not unknown alterations observed. Patient transferred to care of Hema Bond RN.  Via handoff sheet  Family updated and directed to Hema Bond  Via waiting room staff    6/28/2022 5:39 PM

## 2022-06-28 NOTE — H&P
Guillermo Al    [id number]    [facility] Same Day Surgery Update H & P  Department of General Surgery   Surgical Service   Pre-operative History and Physical  Last H & P within the last 30 days. DIAGNOSIS:   Primary osteoarthritis of right knee [M17.11]    Procedure(s):  RIGHT TOTAL KNEE RPELACEMENT    History obtained from: Patient interview and EHR      HISTORY OF PRESENT ILLNESS:   Patient is a 71 y.o. male with c/o right knee pain in the setting of arthrosis. The symptoms have been recalcitrant to conservative treatment and the patient presents today for the above procedure. Illness screening:  Patient denies recent fever, chills, worsening cough, or known sick exposure     Past Medical History:        Diagnosis Date    Arthritis     [address] [address]      Past Surgical History:        Procedure Laterality Date    HERNIA REPAIR      TONSILLECTOMY         Medications Prior to Admission:      Prior to Admission medications    Medication Sig Start Date End Date Taking? Authorizing Provider   metFORMIN (GLUCOPHAGE) 500 MG tablet Take 1,000 mg by mouth at bedtime   Yes Historical Provider, MD   Multiple Vitamins-Minerals (THERAPEUTIC MULTIVITAMIN-MINERALS) tablet Take 1 tablet by mouth daily   Yes Historical Provider, MD   glipiZIDE (GLUCOTROL) 5 MG tablet TAKE 1 TABLET BY MOUTH EVERY DAY 6/10/22   Historical Provider, MD   testosterone cypionate (DEPOTESTOTERONE CYPIONATE) 200 MG/ML injection INJECT 1.5ML INTO THE MUSCLE EVERY 14 DAYS 11/8/19   Historical Provider, MD   ONE TOUCH ULTRA TEST strip use to test blood sugar once daily 11/5/18   Historical Provider, MD   aspirin 81 MG chewable tablet Take 81 mg by mouth daily  8/15/17   Historical Provider, MD   atorvastatin (LIPITOR) 40 MG tablet TAKE 1 TABLET BY MOUTH ONE TIME A DAY 11/4/18   Historical Provider, MD   Lancets Misc.  MISC 1 Stick by Other route 11/12/18   Historical Provider, MD   lisinopril (PRINIVIL;ZESTRIL) 5 MG tablet 10 mg  11/4/18 Historical Provider, MD   meloxicam (MOBIC) 15 MG tablet Take 15 mg by mouth  Patient not taking: Reported on 6/23/2022 10/16/18   Historical Provider, MD   metFORMIN (GLUCOPHAGE) 500 MG tablet 1,000 mg at bedtime 500 mg in the morning and 1000 mg at night 6/6/18   Historical Provider, MD         Allergies:  No known allergies    PHYSICAL EXAM:      BP (!) 136/93   Pulse 79   Temp 97.8 °F (36.6 °C) (Temporal)   Resp 16   Ht 5' 10\" (1.778 m)   Wt 224 lb (101.6 kg)   SpO2 98%   BMI 32.14 kg/m²      Airway:  Airway patent with no audible stridor. Heart:  Regular rate and rhythm. No murmur noted. Lungs:  No increased work of breathing, good air exchange, clear to auscultation bilaterally, no crackles or wheezing. Abdomen:  Soft, non-distended, non-tender, no rebound tenderness or guarding, and no masses palpated. ASSESSMENT AND PLAN     Patient is a 71 y.o. male with above specified procedure planned. 1.  The patients history and physical was obtained and signed off by the pre-admission testing department. Patient seen and focused exam done today- no new changes since last physical exam on 6/8/2022.    2.  Access to ancillary services are available per request of the provider.     Duy Lora, RADHA - CORNELIA     6/28/2022'

## 2022-06-28 NOTE — PROGRESS NOTES
Physical Therapy  Facility/Department: [address] [place] [address] GENERAL SURGERY  Physical Therapy Initial Assessment, Treatment and discharge    Name: Cordelia Cochran  : 1950  MRN: [id number]  Date of Service: 2022    Discharge Recommendations:    Cordelia Cochran scored a  18/24 on the AM-PAC short mobility form. Current research shows that an AM-PAC score of 18 or greater is typically associated with a discharge to the patient's home setting. Please see assessment section for further patient specific details. If patient discharges prior to next session this note will serve as a discharge summary. Please see below for the latest assessment towards goals. PT Equipment Recommendations  Equipment Needed: Yes  Mobility Devices: Cindra Greenlandic: Rolling      Patient Diagnosis(es): The encounter diagnosis was Arthritis of right knee. Past Medical History:  has a past medical history of Arthritis, BPH (benign prostatic hyperplasia), Diabetes ([address] DJD (degenerative joint disease) of knee, and Tobacco abuse. Past Surgical History:  has a past surgical history that includes Tonsillectomy and hernia repair. Assessment   Assessment: Pt is 72 yo M for TKR. Pt moving well. Issued crutches and wheeled walker. Pt going home with 24 hr A and PT.  D/c. Therapy Prognosis: Good  Decision Making: Low Complexity  Requires PT Follow-Up: Yes  Activity Tolerance  Activity Tolerance: Patient tolerated treatment well     Plan   Safety Devices  Type of Devices: Left in bed,Nurse notified,Call light within reach     Restrictions  Position Activity Restriction  Other position/activity restrictions: WBAT RLE per MD orders     Subjective   General  Chart Reviewed:  Yes  Additional Pertinent Hx: Admit  for R TKR  Referring Practitioner: Dr. Esha Lafleur  Diagnosis: R TKR  Subjective  Subjective: Pt supine in bed and agreeable to PT         Social/Functional History  Social/Functional History  Lives With: Daughter  Type of Home: [facility] Layout: One level  Home Access: Stairs to enter without rails  Entrance Stairs - Number of Steps: 3  Bathroom Shower/Tub: Tub/Shower unit  Bathroom Toilet: Standard (sink for leverage)  Has the patient had two or more falls in the past year or any fall with injury in the past year?: No  ADL Assistance: [address]: Independent (shares with daughter)  Homemaking Responsibilities: Yes  Ambulation Assistance: Independent  Transfer Assistance: Independent  Active : Yes  Occupation: Full time employment  Type of Occupation:  6 days a week  Vision/Hearing  Vision  Vision: Within Functional Limits  Hearing  Hearing: Within functional limits    Cognition   Orientation  Overall Orientation Status: Within Functional Limits     Objective   Heart Rate: 95  Heart Rate Source: Monitor  BP: 122/88  BP Location: Right upper arm  BP Method: Automatic  Patient Position: Supine  MAP (Calculated): 99.33  Resp: 16  SpO2: 91 %  O2 Device: None (Room air)              AROM RLE (degrees)  RLE General AROM: Knee 12-88  AROM LLE (degrees)  LLE AROM : WFL  Strength RLE  Comment: >3/5  Strength LLE  Strength LLE: WFL           Bed mobility  Supine to Sit: Stand by assistance  Sit to Supine: Stand by assistance  Scooting: Stand by assistance  Transfers  Sit to Stand: Stand by assistance  Stand to sit: Stand by assistance  Ambulation  Device: Rolling Walker  Assistance: Stand by assistance  Gait Deviations: Decreased step length;Decreased step height;Slow Nataly  Distance: 100 feet and 50 feet  Stairs  # Steps : 3  Stairs Height: 6\"  Device: Crutches  Assistance: Minimal assistance  Comment: Daughter to assist at home        Exercise Treatment: TKR HEP x 3 reps. Issued copy of HEP.         OutComes Score                                                  AM-PAC Score             Goals          Education  Patient Education  Education Given To: Patient  Education Provided: Role of Therapy  Education Outcome: Verbalized understanding      Therapy Time   Individual Concurrent Group Co-treatment   Time In 1650         Time Out 1720         Minutes 30              Timed Code Treatment Minutes:   15    Total Treatment Minutes:  [address], PT

## 2022-06-28 NOTE — ANESTHESIA PROCEDURE NOTES
Peripheral Block    Patient location during procedure: pre-op  Reason for block: procedure for pain, post-op pain management and at surgeon's request  Staffing  Performed: anesthesiologist   Anesthesiologist: Sreedhar Rivera DO  Preanesthetic Checklist  Completed: patient identified, IV checked, site marked, risks and benefits discussed, surgical/procedural consents, equipment checked, pre-op evaluation, timeout performed, anesthesia consent given, oxygen available, monitors applied/VS acknowledged, fire risk safety assessment completed and verbalized and blood product R/B/A discussed and consented  Peripheral Block   Patient position: supine  Prep: ChloraPrep  Patient monitoring: cardiac monitor, continuous pulse ox, continuous capnometry, frequent blood pressure checks, oxygen, responsive to questions and IV access  Block type: Femoral  Adductor canal  Laterality: right  Injection technique: single-shot  Guidance: ultrasound guided    Needle   Needle gauge: 25 G  Needle localization: anatomical landmarks and ultrasound guidance  Assessment   Injection assessment: negative aspiration for heme, no paresthesia on injection, local visualized surrounding nerve on ultrasound and no intravascular symptoms  Paresthesia pain: none  Slow fractionated injection: yes  Hemodynamics: stable  Real-time US image taken/store: yes  Outcomes: uncomplicated and patient tolerated procedure well    Additional Notes  Sterile prep. Timeout with SDS RN. 2 mg versed + 100 micrograms fentanyl IV for pt comfort. 30 mL 0.5% Bupivacaine injected in 5 mL increments following negative aspiration. Tip of needle in view at all times. No pain or paresthesias on injection. Pt tolerated the procedure well.

## 2022-06-28 NOTE — PROGRESS NOTES
Patient passed PT/OT but requires a walker for home. Appropriate paperwork filled out with walker set by PT/OT at patient's specific height. Cristo Granados will go with patient to same day for discharge.

## 2022-06-28 NOTE — PROGRESS NOTES
Kelsea Cruz      Current Allergies: No known allergies    Recent Labs     06/28/22  1024 06/28/22  1433   POCGLU 119* 154*       Admitted to PACU bed 10 from OR. Arrived on a bed . Attached to PACU monitoring system. Alarms and parameters set. Report received from anesthesia personnel Dr Laurence Gitelman. OR staff did not report skin issues that were observed while in OR  No problems reported intraoperatively. Pt arrived with oxygen per nasal cannula with oxygen at 6 liters. Athrombic wraps and aimee hose on non operative leg  Ace wrap on the operative leg     Doctors aware of all labs before coming to recovery.

## 2022-06-28 NOTE — PROGRESS NOTES
Pt to [facility] for right knee replacement. Pt is alert; oriented X 4; speech clear; breathing easily on RA; denies any pain; walks with steady gait without assist.  Right knee clipped and scrubbed 15 to 20 minutes later with BJ wipes. Pt has hx of smoking, and smokes 2 packs/day, and smoked today. Duoneb ordered by Dr. Andres Claros, and same was done at 1020. Medicated pt in preop with gabapentin, tylenol, and decadron 10 MG IV. #18 IV placed in left forearm, and T&S X 2 drawn and sent to lab. Accucheck is 119, and pt is a diabetic. Dr. Belia Jiménez administered right adductor block and versed 2mg and fentanyl 100 mcg given, and pt became apneic with sedation, and O2 turned up to 4 L NC. Pt now waking up slowly, and daughters at bedside. This RN also started Vancomycin in [facility], and verified okay with OR.   Have given report to Wong nuñez RN

## 2022-06-29 ENCOUNTER — CARE COORDINATION (OUTPATIENT)
Dept: CASE MANAGEMENT | Age: 72
End: 2022-06-29

## 2022-06-29 ENCOUNTER — TELEPHONE (OUTPATIENT)
Dept: ORTHOPEDIC SURGERY | Age: 72
End: 2022-06-29

## 2022-06-29 NOTE — OP NOTE
[address]                [address]                                OPERATIVE REPORT    PATIENT NAME: Fani Falcon                      :        1950  MED REC NO:   [id number]                          ROOM:  ACCOUNT NO:   [de-identified]                           ADMIT DATE: 2022  PROVIDER:     Ronald Molina MD    DATE OF PROCEDURE:  2022    OPERATION:  Right total knee arthroplasty. SURGEON:  Ronald Molina MD    ASSISTANTS:  Shirlye Monsalve PA-C and orthopedic fellow was Keron Montanez MD    ANESTHESIA:  General.    PREOPERATIVE DIAGNOSIS:  Osteoarthritis, right knee. POSTOPERATIVE DIAGNOSIS:  Osteoarthritis, right knee. PREPARATION:  ChloraPrep. INDICATION:  This is a 61-year-old gentleman who presents with  progressive right knee pain refractory to physical therapy,  anti-inflammatory medications, intraarticular injections. X-rays  demonstrate bone-on-bone changes present primarily in the medial  compartment, but also on the patellofemoral joint and he presents for a  total knee arthroplasty. Risks and benefits of surgery as well as  nonsurgical alternatives were discussed in detail with the patient who  understood and consented to the operation. DESCRIPTION OF PROCEDURE:  I saw the patient in the holding area. We  identified the right knee was the operative extremity. I initialed the  operative site after inspecting it and he was then taken to the OR where  after induction of general anesthesia, a tourniquet was placed on the  right thigh. The right leg was prepped and draped in a sterile fashion. A timeout was performed and the OR team agreed the right knee was the  operative site. The initials were verified. The leg was exsanguinated  with an Esmarch bandage. Tourniquet was inflated to 300 mmHg. A  midline incision was made and carried down to the extensor mechanism.    Medial parapatellar arthrotomy was performed. Patella was everted. Fat  pad was resected. Suprapatellar synovium was excised. Medial release  was performed off the tibia to direct the varus deformity that was  present. ACL was resected. Menisci were resected. The patella was  measured and using the patella cutting guide, the appropriate bone was  resected. [place] holes were then drilled on the patella and a protector  plate was placed and the patella was then dislocated laterally and then  knee was flexion to 90 degrees. Distal femur was exposed and the  patient-specific distal femoral cutting guide was then secured in  position and the distal femoral cut was carried out without difficulty. A 3 in 1 cutting guide was then placed in anterior, posterior, and  chamfer cuts were carried out with the appropriate amounts of bone being  resected. The tibia was then subluxated anteriorly. Proximal PCL was  released. MCL was protected. Using the patient-specific cutting guide,  proximal tibial cut was carried out. Trial components were then placed. Appropriate degree of rotation was determined. Appropriate alignment  was noted. The patient's knee was then turned to 90 degrees where the  anchor post was drilled along with the _____. The wound was then  irrigated with antibiotic-containing normal saline solution. Following  which the following components were cemented in place. They included a  size 7 Attune cruciate-retaining femoral component, a size 7 Attune  fixed tibial base plate, and a 38 mm patellar button. These were  compressed as the cement hardened following which the tourniquet was  deflated. Hemostasis was achieved using electrocautery. The wound was  then copiously irrigated with antibiotic-containing normal saline  solution and a 6-mm tibial insert was then fitted with excellent  fixation. Knee was taken through full flexion and extension. There was  full range of motion.   There was normal patella tracking. Dilute iodine  solution was then placed in the wound, left for four minutes, and then  irrigated out with antibiotic-containing normal saline solution. Vancomycin was then placed intraarticularly and the extensor mechanism  was closed with interrupted 0 Vicryl suture and Stratafix suture. Subcutaneous tissue was closed in layers with Vicryl. Skin was closed  with Monocryl. Sterile dressing was applied. The patient was awakened  and taken to the recovery room in stable condition. The sponge and  needle counts were correct at the conclusion of the procedure and blood  loss was minimal.      Due to the complexity of procedure the assistance provided by Shirley Monsalve PA-C was necessary for assisting in patient positioning as well as maintaining proper alignment of the limb, assisting in positioning of the cutting guides for bone resection as well as for implant placement. She also assisted with the multilayer wound closure required at the end of the procedure.       Raj Walter MD    D: 06/28/2022 15:06:05       T: 06/28/2022 22:46:39     [id number]  Job#: [id number]     Doc#: [id number]    CC:

## 2022-06-29 NOTE — TELEPHONE ENCOUNTER
Called patient regarding right knee surgery on 06/28/22. Phone rings multiple times and then has a message that states memory is full. His daughter called back a few minutes later and reports that he is doing well and is trying to \"overdo things\". She had questions regarding when her dad starts therapy. Teams message sent to [place]. Tried patient's cell phone and got a voicemail.     Anali Noyola  Orthopedic Nurse Navigator  [phone]

## 2022-06-29 NOTE — CARE COORDINATION
[facility] Initial Follow Up [facility]    Call within 2 business days of discharge: Yes    Patient: Sussy Parsons Patient : 1950   MRN: <[id number]>  Reason for Admission: RIGHT TOTAL KNEE [place] Discharge Date: 22 RARS: No data recorded    Last Discharge [facility]       Complaint Diagnosis Description Type Department Provider    22  Arthritis of right knee Admission (Discharged) Johnathan Epperson MD           Spoke with: Sussy Parsons who stats that he is doing good. Patient state that he has some soreness and pain but it is being managed. Patient reports that dressing is in place and it is C/D/I. Patient denies cp, sob, cough, dizziness, headache, n/v, diarrhea, abdominal pains, fever, or chills. Patient report that appetite and fluid intake is good and denies any problems with bowel or bladder. Patient is taking all medications as ordered. Writer and patient did a complete medication review and 1111f was completed. Denies any needs at this time. Patient instructed to continue to monitor s/s, reporting any that may present to MD immediately for early intervention. Reminded of COVID 19 precautions. Agreeable to f/u calls. [facility] provided contact information for Isidro Frausto RN  future needs. Facility: [facility].    Non-face-to-face services provided:  Obtained and reviewed discharge summary and/or continuity of care documents  Education of patient/family/caregiver/guardian to support self-management-s/s of Covid and when to contact the doctor  Assessment and support for treatment adherence and medication management-.     Transitions of Care Initial Call    Was this an external facility discharge?  No Discharge Facility: [address] to be reviewed by the provider   Additional needs identified to be addressed with provider: No  none             Method of communication with provider : none    Advance Care Planning:   Does patient have an Advance Directive: not on file. LPN Care Coordinator contacted the patient by telephone to perform post hospital discharge assessment. Verified name and  with patient as identifiers. Provided introduction to self, and explanation of the LPN CC role. LPN CC reviewed discharge instructions, medical action plan and red flags with patient who verbalized understanding. Patient given an opportunity to ask questions and does not have any further questions or concerns at this time. Were discharge instructions available to patient? Yes. Reviewed appropriate site of care based on symptoms and resources available to patient including: PCP  Specialist  Home health  When to call 911. The patient agrees to contact the PCP office for questions related to their healthcare. Medication reconciliation was performed with patient, who verbalizes understanding of administration of home medications. Advised obtaining a 90-day supply of all daily and as-needed medications. Was patient discharged with a pulse oximeter? no    LPN CC provided contact information. Plan for follow-up call in 5-7 days based on severity of symptoms and risk factors.   Plan for next call: symptom management-Pain, any s/s of wound infection        Care Transitions 24 Hour Call    Do you have a copy of your discharge instructions?: Yes  Do you have all of your prescriptions and are they filled?: Yes  Have you been contacted by a [address]?: No  Have you scheduled your follow up appointment?: Yes  How are you going to get to your appointment?: Car - family or friend to transport  Do you feel like you have everything you need to keep you well at home?: Yes  Care Transitions Interventions   Home Care Waiver: Completed Physical Therapy: Completed           Follow Up  Future Appointments   Date Time Provider Ana María Rodriguez   2022 11:30 AM ALIN Chen [facility] EVE Charles LPN

## 2022-06-30 ENCOUNTER — TELEPHONE (OUTPATIENT)
Dept: ORTHOPEDIC SURGERY | Age: 72
End: 2022-06-30

## 2022-06-30 NOTE — TELEPHONE ENCOUNTER
Patient is having a lot of pain. Please advise. Call patient wife bandar between [phone] that is her lunch time. 27-Dec-2018 18:14

## 2022-06-30 NOTE — TELEPHONE ENCOUNTER
General Question     Subject: Verbal PT orders  Patient and /or Facility Request: Margarita Rasheed  Contact Number: [phone]    Volodymyr Oh at [facility] calling regarding verbal pt orders    3x per week for one week    2x per week for one week    Please call back Volodymyr Oh at the above number

## 2022-07-01 ENCOUNTER — TELEPHONE (OUTPATIENT)
Dept: ORTHOPEDIC SURGERY | Age: 72
End: 2022-07-01

## 2022-07-01 NOTE — TELEPHONE ENCOUNTER
Pain is under control is taking 1 and half tablets every 4-6 hours    Sleeping better at night  Started therapy

## 2022-07-06 ENCOUNTER — CARE COORDINATION (OUTPATIENT)
Dept: CASE MANAGEMENT | Age: 72
End: 2022-07-06

## 2022-07-06 NOTE — CARE COORDINATION
[address] Follow Up Call    2022    Patient: Jo Freed    Patient : 1950   MRN: <[id number]>    Surgery: R TKA by Dr Gabby Godoy  Discharge Date: 22   RARS: No data recorded       Spoke with Jo Freed (patient)    Incision status: bandage CDI    Edema/Swelling: some swelling but improving    Pain level and status: 4/10    Use of pain medications: taking ibuprofen 800mg twice daily    Bowels: [facility] active: PT is there now     Outpatient therapy: NA    Do you have all of your medications: yes    Changes in medications: no    Reviewed appt in 2 days as noted below. Denies needs.      Dax Camara RN  Care Transition Nurse  [phone] mobile    Future Appointments   Date Time Provider Ana María Rodriguez   2022 11:30 AM ALIN Camacho [facility] EVE [place]ILL

## 2022-07-08 ENCOUNTER — OFFICE VISIT (OUTPATIENT)
Dept: ORTHOPEDIC SURGERY | Age: 72
End: 2022-07-08

## 2022-07-08 VITALS — BODY MASS INDEX: 32.07 KG/M2 | HEIGHT: 70 IN | WEIGHT: 224 LBS

## 2022-07-08 DIAGNOSIS — Z96.651 S/P TOTAL KNEE ARTHROPLASTY, RIGHT: ICD-10-CM

## 2022-07-08 DIAGNOSIS — M17.11 PRIMARY OSTEOARTHRITIS OF RIGHT KNEE: Primary | ICD-10-CM

## 2022-07-08 PROCEDURE — 99024 POSTOP FOLLOW-UP VISIT: CPT | Performed by: PHYSICIAN ASSISTANT

## 2022-07-08 NOTE — PROGRESS NOTES
Chief Complaint  Post-Op Check (s/p right tkr)      History of Present Illness:  Whitney Smith is a pleasant 71 y.o. male here for follow-up regarding his right knee. He is 10 days status post right total knee arthroplasty. Doing well with no concerns. Has been compliant with his at home therapy. Is weaning off his pain medicine. Denies any fevers chills nausea vomiting. Denies any bleeding or oozing from surgical incision site. Medical History:  Patient's medications, allergies, past medical, surgical, social and family histories were reviewed and updated as appropriate. Pain Assessment  Location of Pain: Knee  Location Modifiers: Right  Severity of Pain: 5  Quality of Pain: Sharp  Duration of Pain: Persistent  Limiting Behavior: Yes  Relieving Factors: Ice (pain meds)  Result of Injury: No  Work-Related Injury: No  Are there other pain locations you wish to document?: No  ROS: Review of systems reviewed from Patient History Form completed today and available in the patient's chart under the Media tab. Pertinent items are noted in HPI  Review of systems reviewed from Patient History Form completed today and available in the patient's chart under the Media tab. Vital Signs:  Ht 5' 10\" (1.778 m)   Wt 224 lb (101.6 kg)   BMI 32.14 kg/m²     Right knee examination:    Gait: use of crutches    Alignment: Alignment appreciated. Inspection/skin: Incision healing well. No indication of infection. No dehiscence. Skin is intact without erythema or ecchymosis. No gross deformity. Palpation: Nontender to light touch. Range of Motion: Near full extension. Flexion to approximately 90 without pain. Strength: seated straight leg raise without pain or difficulty. Effusion: No apparent effusion. Ligamentous stability: No gross instability. Neurologic and vascular: Skin is warm and well-perfused. Distally neurovascularly intact. Additional findings: Calf soft nontender. Sensation is intact to light-touch. No pretibial edema. Left knee examination:    Gait: Use of crutches    Alignment: Alignment appreciated. Inspection/skin: Skin is intact without erythema or ecchymosis. No gross deformity. Palpation: No point tenderness. Nontender to light touch. Range of Motion: Full ROM    Strength: good quad strength    Effusion: No apparent effusion. Ligamentous stability: No gross instability. Neurologic and vascular: Skin is warm and well-perfused. Distally neurovascularly intact. Additional findings: Calf soft nontender. Sensation is intact to light-touch. No pretibial edema. Radiology:       Pertinent imaging was interpreted and reviewed with the patient today, images only - no report available. Radiographs were obtained and reviewed in the office; 4 views: bilateral PA, bilateral [place], bilateral [place] AND right lateral    Impression: Total knee arthroplasty components in appropriate position          Assessment : 52-year-old male 10-day status post right total knee arthroplasty    Impression:  Encounter Diagnoses   Name Primary?     Primary osteoarthritis of right knee Yes    S/P total knee arthroplasty, right        Office Procedures:  Orders Placed This Encounter   Procedures    XR KNEE RIGHT (3 VIEWS)     Standing Status:   Future     Number of Occurrences:   1     Standing Expiration Date:   7/7/2023     Order Specific Question:   Reason for exam:     Answer:   pain    XR KNEE LEFT (1-2 VIEWS)     Standing Status:   Future     Number of Occurrences:   1     Standing Expiration Date:   7/7/2023     Order Specific Question:   Reason for exam:     Answer:   pain    Ambulatory referral to Physical Therapy     Referral Priority:   Routine     Referral Type:   Eval and Treat     Referral Reason:   Specialty Services Required     Requested Specialty:   Physical Therapist     Number of Visits Requested:   1         Plan: Pertinent imaging was reviewed. The etiology, natural history, and treatment options for the disorder were discussed. The roles of activity medication, antiinflammatories, injections, bracing, physical therapy, and surgical interventions were all described to the patient and questions were answered. Patient is 10 days status post right total knee arthroplasty. On exam he has good motion, his incisions are healing well. It is important to note that patient had to pack the cigarettes in front shirt pocket. This was discussed and patient was advised that smoking can hinder healing postsurgically. I will like him to start outpatient therapy, he would like to do this in [facility]. Continue taking 1 aspirin a day for blood clot prevention. Follow-up in 1 month or sooner if worsening symptoms. Carrie Lagunas is in agreement with this plan. All questions were answered to patient's satisfaction and was encouraged to call with any further questions. Carol Galicia, [address] [place] [address] Ave  7/8/2022    This dictation was performed with a verbal recognition program [facility]) and it was checked for errors. It is possible that there are still dictated errors within this office note. If so, please bring any areas to my attention for an addendum. All efforts were made to ensure that this office note is accurate.

## 2022-07-12 ENCOUNTER — TELEPHONE (OUTPATIENT)
Dept: ORTHOPEDIC SURGERY | Age: 72
End: 2022-07-12

## 2022-07-12 NOTE — TELEPHONE ENCOUNTER
General Question     Subject: PATIENT'S DAUGHTER STATES HIS BLOOD PRESSURE HAS BEEN RUNNING LOW. STATES LAST BLOOD PRESSURE WAS 80/49 PATIENT HAS BEEN SLEEPING ALL DAY. PLEASE ADVISE.    Patient'S DAUGHTER [phone]  Contact Number: [phone]

## 2022-07-14 ENCOUNTER — CARE COORDINATION (OUTPATIENT)
Dept: CASE MANAGEMENT | Age: 72
End: 2022-07-14

## 2022-07-14 NOTE — CARE COORDINATION
Called patient for updates. States currently at [facility] for atrial fib. States had an ablation done and starting new meds today. Hopes to be discharged today or tomorrow. States everything with knee is going well.   Jessica Mckenna BSN  Care Transition Nurse for ortho bundle  [phone]

## 2022-07-15 ENCOUNTER — TELEPHONE (OUTPATIENT)
Dept: ORTHOPEDIC SURGERY | Age: 72
End: 2022-07-15

## 2022-07-18 ENCOUNTER — CARE COORDINATION (OUTPATIENT)
Dept: CASE MANAGEMENT | Age: 72
End: 2022-07-18

## 2022-07-18 ENCOUNTER — EVALUATION (OUTPATIENT)
Dept: PHYSICAL THERAPY | Age: 72
End: 2022-07-18
Payer: MEDICARE

## 2022-07-18 DIAGNOSIS — R29.898 WEAKNESS OF EXTREMITY: ICD-10-CM

## 2022-07-18 DIAGNOSIS — M17.11 OSTEOARTHRITIS OF RIGHT KNEE, UNSPECIFIED OSTEOARTHRITIS TYPE: Primary | ICD-10-CM

## 2022-07-18 DIAGNOSIS — M25.561 CHRONIC PAIN OF RIGHT KNEE: ICD-10-CM

## 2022-07-18 DIAGNOSIS — G89.29 CHRONIC PAIN OF RIGHT KNEE: ICD-10-CM

## 2022-07-18 DIAGNOSIS — R26.9 GAIT DIFFICULTY: ICD-10-CM

## 2022-07-18 PROCEDURE — 97530 THERAPEUTIC ACTIVITIES: CPT | Performed by: PHYSICAL THERAPIST

## 2022-07-18 PROCEDURE — 97110 THERAPEUTIC EXERCISES: CPT | Performed by: PHYSICAL THERAPIST

## 2022-07-18 PROCEDURE — 97161 PT EVAL LOW COMPLEX 20 MIN: CPT | Performed by: PHYSICAL THERAPIST

## 2022-07-18 NOTE — PROGRESS NOTES
[facility]   Phone: [phone]    Fax: [phone]          Physical Therapy Certification       Dear Referring Practitioner: Dr. Oskar Solano,    We had the pleasure of evaluating the following patient for physical therapy services at [address] [place] [address]. A summary of our findings can be found in the initial assessment below. This includes our plan of care. If you have any questions or concerns regarding these findings, please do not hesitate to contact me at the office phone number checked above. Thank you for the referral.       Physician Signature:_______________________________Date:__________________  By signing above (or electronic signature), therapists plan is approved by physician      Patient: Sweta Roger   : 1950   MRN: [id number]  Referring Physician: Referring Practitioner: Dr. Oskar Solano      Evaluation Date: 2022      Medical Diagnosis Information:  Diagnosis: R TKA:   DOS: R TKA on 22      Treating Diagnosis: limited gt skills, weakness in R LE, pain in knee, edema in R knee, limited ROM; Insurance information: PT Insurance Information: medicare/[place]                                                Precautions/ Contra-indications: DM, recent surg,  Latex Allergy:  [x]NO      []YES    C-SSRS Triggered by Intake questionnaire (Past 2 wk assessment):   [x] No, Questionnaire did not trigger screening.   [] Yes, Patient intake triggered further evaluation      [] C-SSRS Screening completed  [] PCP notified via Plan of Care  [] Emergency services notified       Preferred Language for Healthcare:   [x]English       []other:      SUBJECTIVE: Patient stated complaint: R knee pain. Pt has been having chronic pain in R knee and failed conservative approach. Pt underwent surg on 22 for R TKA. [facility] x2 weeks.  Pt did have to go back into hospital due to heart fluctuations and had an ablation on 7/14/22. He has been monitoring his heart rate since. He does have steps at home and someone to help him when needed. 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;     Living Status/Prior Level of Function: Independent with ADLs and IADLs, limited prior to surg w/ pain that limited walking, squatting;     OBJECTIVE:   7/18/22 /78 and     Flexibility L R Comment   Hamstring Min limitations Mod limitations 7/18/2022   Gastroc Mod limitations Mod limitations    ITB      Quad                ROM LEFT RIGHT   HIP Flex     HIP Abd     HIP Ext     HIP IR     HIP ER     Knee ext  -10   Knee Flex  95 on ERMI   Ankle PF     Ankle DF     Ankle In     Ankle Ev     Strength  LEFT RIGHT   HIP Flexors     HIP Abductors     HIP Ext     Hip ER     Knee EXT (quad) good Fair+   Knee Flex (HS)     Ankle DF     Ankle PF     Ankle Inv     Ankle EV          Circumference  Mid apex  7 cm prox     39 cm  41 cm   43 cm  45cm       Reflexes/Sensation:    []Dermatomes/Myotomes intact    []Reflexes equal and normal bilaterally   [x]Other: 7/18/22 deferred due to surg; intact to light touch;    Joint mobility: 7/18/22 deferred due to surg;   []Normal    []Hypo   []Hyper    Palpation: general pain around knee w/ warmth related to edema in R LE extending into lower leg;  dec patella mobility;      Functional Mobility/Transfers: extended time needed to transfer from standing to/from sitting and sitting to/from supine;    Posture: ectomorph body structure;     Bandages/Dressings/Incisions: incision healing well w/o symptoms or signs of infection. No drainage;    Gait: (include devices/WB status) amb w/ slight limp on R LE w/ SPC in R UE & shortened stride length;    Orthopedic Special Tests: 7/18/22 deferred due to surg;      Special Test Results/Comment   Meniscal Click    Crepitus    Flexion Test    Valgus Laxity    Varus Laxity    Lachmans    Drop Back       TEST    GOAL   SINGLE LEG STANCE TIME tandem   >25 SECONDS   TIMED UP And GO    61-75 y/o: <9sec  66-78 y/o: <10sec  80-79 y/o: <12 sec   6 MINUTE WALK TEST                      M             F          60-70 y/o: >.31mile,  >.30mile  66-78 y/o: >.28 mile, >.26 mile  80-79 y/o: >.21mile,  >.20 mile   STAIR CLIMBING TEST    < 13 SEC (M&F)                        [x] Patient history, allergies, meds reviewed. Medical chart reviewed. See intake form. Review Of Systems (ROS):  [x]Performed Review of systems (Integumentary, CardioPulmonary, Neurological) by intake and observation. Intake form has been scanned into medical record. Patient has been instructed to contact their primary care physician regarding ROS issues if not already being addressed at this time.       Co-morbidities/Complexities (which will affect course of rehabilitation):   []None           Arthritic conditions   []Rheumatoid arthritis (M05.9)  []Osteoarthritis (M19.91)   Cardiovascular conditions   []Hypertension (I10)  []Hyperlipidemia (E78.5)  []Angina pectoris (I20)  []Atherosclerosis (I70)  []CVA Musculoskeletal conditions   []Disc pathology   []Congenital spine pathologies   []Prior surgical intervention  []Osteoporosis (M81.8)  []Osteopenia (M85.8)   Endocrine conditions   []Hypothyroid (E03.9)  []Hyperthyroid Gastrointestinal conditions   []Constipation (N16.52)   Metabolic conditions   []Morbid obesity (E66.01)  [x]Diabetes type 1(E10.65) or 2 (E11.65)   []Neuropathy (G60.9)     Pulmonary conditions   []Asthma (J45)  []Coughing   []COPD (J44.9)   Psychological Disorders  []Anxiety (F41.9)  []Depression periods/distances/surfaces   [x]Reduced ability to ascend/descend stairs   [x]Reduced ability to run, hop or jump   []other:     Participation Restrictions   [x]Reduced participation in self care activities   [x]Reduced participation in home management activities   [x]Reduced participation in work activities   [x]Reduced participation in social activities. [x]Reduced participation in sport activities. Classification :    [x]Signs/symptoms consistent with post-surgical status including decreased ROM, strength and function.    []Signs/symptoms consistent with joint sprain/strain   []Signs/symptoms consistent with patella-femoral syndrome   []Signs/symptoms consistent with knee OA/hip OA   []Signs/symptoms consistent with internal derangement of knee/Hip   []Signs/symptoms consistent with functional hip weakness/NMR control      []Signs/symptoms consistent with tendinitis/tendinosis    []signs/symptoms consistent with pathology which may benefit from Dry needling      []other:      Prognosis/Rehab Potential:      [x]Excellent   [x]Good    []Fair   []Poor    Tolerance of evaluation/treatment:    []Excellent   [x]Good    []Fair   []Poor    Physical Therapy Evaluation Complexity Justification  [x] A history of present problem with:  [] no personal factors and/or comorbidities that impact the plan of care;  [x]1-2 personal factors and/or comorbidities that impact the plan of care  []3 personal factors and/or comorbidities that impact the plan of care  [x] An examination of body systems using standardized tests and measures addressing any of the following: body structures and functions (impairments), activity limitations, and/or participation restrictions;:  [] a total of 1-2 or more elements   [] a total of 3 or more elements   [x] a total of 4 or more elements   [x] A clinical presentation with:  [x] stable and/or uncomplicated characteristics   [] evolving clinical presentation with changing characteristics  [] unstable and unpredictable characteristics;   [x] Clinical decision making of [x] low, [] moderate, [] high complexity using standardized patient assessment instrument and/or measurable assessment of functional outcome. [x] EVAL (LOW) 04866 (typically 20 minutes face-to-face)  [] EVAL (MOD) 84844 (typically 30 minutes face-to-face)  [] EVAL (HIGH) 25606 (typically 45 minutes face-to-face)  [] RE-EVAL     PLAN:  Frequency/Duration:  2 days per week for 6 Weeks:  Interventions:  [x]  Therapeutic exercise including: strength training, ROM, for Lower extremity and core   [x]  NMR activation and proprioception for LE, Glutes and Core   [x]  Manual therapy as indicated for LE, Hip and spine to include: Dry Needling/IASTM, STM, PROM, Gr I-IV mobilizations, manipulation. [x] Modalities as needed that may include: thermal agents, E-stim, Biofeedback, US, iontophoresis as indicated  [x] Patient education on joint protection, postural re-education, activity modification, progression of HEP. HEP instruction: Instructed patient in a HEP. Patient demonstrated exercises correctly. Handout with  pictures and # of reps/sets was given to pt. Exercises included those listed above. PT's business card with information given to pt for any questions or problems that may occur before next visit. GOALS:  Patient stated goal: to return to work as a  w/ squatting and lifting  [] Progressing: [] Met: [] Not Met: [] Adjusted    Therapist goals for Patient:   Short Term Goals: To be achieved in: 2 weeks  1. Independent in HEP and progression per patient tolerance, in order to prevent re-injury. [] Progressing: [] Met: [] Not Met: [] Adjusted  2. Patient will have a decrease in pain to facilitate improvement in movement, function, and ADLs as indicated by Functional Deficits. [] Progressing: [] Met: [] Not Met: [] Adjusted    Long Term Goals: To be achieved in: 12 weeks  1.  Functional index score of 85 or more for FOTO to assist with reaching prior level of function. [] Progressing: [] Met: [] Not Met: [] Adjusted  2. Patient will demonstrate increased AROM to 0-125 to allow for proper joint functioning as indicated by patients Functional Deficits. [] Progressing: [] Met: [] Not Met: [] Adjusted  3. Patient will demonstrate an increase in Strength to good proximal hip strength and control, within 5lb HHD in LE to allow for proper functional mobility as indicated by patients Functional Deficits. [] Progressing: [] Met: [] Not Met: [] Adjusted  4. Patient will return to 90% functional activities without increased symptoms or restriction. [] Progressing: [] Met: [] Not Met: [] Adjusted  5.  Pt will be able to walk and stand for 8 hr/day to return to work. (patient specific functional goal)    [] Progressing: [] Met: [] Not Met: [] Adjusted     Electronically signed by:  Paloma Beauchamp PT, Jose Antonio Queen 88, OMT-C    Physical Therapist [place] license #[id number]  Physical Therapist [place] license #[id number]

## 2022-07-18 NOTE — PROGRESS NOTES
[facility]   Phone: [phone]    Fax: [phone]      Physical Therapy Treatment Note/ Progress Report:       Date:  2022    Patient Name:  Jo Freed    :  1950  MRN: [id number]  Restrictions/Precautions:    Medical/Treatment Diagnosis Information:  Diagnosis: R TKA   DOS: R TKA on 22      Treating Diagnosis: limited gt skills, weakness in R LE, pain in knee, edema in R knee, limited ROM;            Insurance/Certification information:  PT Insurance Information: medicare/[employer]  Physician Information:  Referring Practitioner: Dr. Gabby Godoy  Has the plan of care been signed (Y/N):        []  Yes  [x]  No     Date of Patient follow up with Physician: not scheduled      Is this a Progress Report:     []  Yes  [x]  No        If Yes:  Date Range for reporting period:  Beginning22  Ending 22    Progress report will be due (10 Rx or 30 days whichever is less):       Recertification will be due (POC Duration  / 90 days whichever is less): 22         Visit # Insurance Allowable Auth Required   1 25 []  Yes []  No        Functional Scale: FOTO 67    Date assessed:  22      Latex Allergy:  [x]NO      []YES  Preferred Language for Healthcare:   [x]English       []other:    Pain level:  3-5/10 w/ OTC mediation only     SUBJECTIVE:  See eval    OBJECTIVE: See eval  Observation:   Test measurements:      RESTRICTIONS/PRECAUTIONS: DM, cardiac ablation on 22 ; R TKA on 22    Exercises/Interventions:   Exercise/Equipment Resistance/Repetitions Other comments   Cardio/Warm-up     Bike     Treadmill          Stretching     Hamstring Seated 10\"x10    Hip Flexion     ITB     Grion     Quad     Inclined Calf On floor 10\"x10    Towel Pull          ROM     Passive ERMI 10\"x10    Active     Weight Shift     Weight Hangs     Sheet Pulls     Ankle Pumps           Patellar Glides     Medial     Superior     Inferior          STRENGTH     SLR Supine Over foam roll 3x10    Prone     Abduction Side lying 3x10    Adducton PS + glut set 10\"x10    SLR+          Isometrics     Quad sets 10\"x10         CKC     Calf raises 2x10    Wall sits     Step ups     1 leg stand     Squatting Mini x10    CC TKE 25# 3x10    Balance          PRE     Extension  RANGE:   Flexion  RANGE:   Leg Press  RANGE:        Cable Column     Ed given on POC, expectations and goals 10'         Manual/Modalities                 Therapeutic Exercise and NMR EXR  [x] (15366) Provided verbal/tactile cueing for activities related to strengthening, flexibility, endurance, ROM for improvements in LE, proximal hip, and core control with self care, mobility, lifting, ambulation.  [] (26618) Provided verbal/tactile cueing for activities related to improving balance, coordination, kinesthetic sense, posture, motor skill, proprioception  to assist with LE, proximal hip, and core control in self care, mobility, lifting, ambulation and eccentric single leg control. NMR and Therapeutic Activities:    [] (66952 or 66255) Provided verbal/tactile cueing for activities related to improving balance, coordination, kinesthetic sense, posture, motor skill, proprioception and motor activation to allow for proper function of core, proximal hip and LE with self care and ADLs  [] (33400) Gait Re-education- Provided training and instruction to the patient for proper LE, core and proximal hip recruitment and positioning and eccentric body weight control with ambulation re-education including up and down stairs     Home Exercise Program:    [x] (18652) Reviewed/Progressed HEP activities related to strengthening, flexibility, endurance, ROM of core, proximal hip and LE for functional self-care, mobility, lifting and ambulation/stair navigation            Written HEP est    Access Code: [email or web address]  URL: [email or web address]  Date: 07/18/2022  Prepared by: Elva Delong    Exercises  Gastroc Stretch on Wall - Progressing: [] Met: [] Not Met: [] Adjusted     Therapist goals for Patient:  Short Term Goals: To be achieved in: 2 weeks  1. Independent in HEP and progression per patient tolerance, in order to prevent re-injury. [] Progressing: [] Met: [] Not Met: [] Adjusted  2. Patient will have a decrease in pain to facilitate improvement in movement, function, and ADLs as indicated by Functional Deficits. [] Progressing: [] Met: [] Not Met: [] Adjusted     Long Term Goals: To be achieved in: 12 weeks  1. Functional index score of 85 or more for FOTO to assist with reaching prior level of function. [] Progressing: [] Met: [] Not Met: [] Adjusted  2. Patient will demonstrate increased AROM to 0-125 to allow for proper joint functioning as indicated by patients Functional Deficits. [] Progressing: [] Met: [] Not Met: [] Adjusted  3. Patient will demonstrate an increase in Strength to good proximal hip strength and control, within 5lb HHD in LE to allow for proper functional mobility as indicated by patients Functional Deficits. [] Progressing: [] Met: [] Not Met: [] Adjusted  4. Patient will return to 90% functional activities without increased symptoms or restriction. [] Progressing: [] Met: [] Not Met: [] Adjusted  5. Pt will be able to walk and stand for 8 hr/day to return to work. (patient specific functional goal)    [] Progressing: [] Met: [] Not Met: [] Adjusted         Overall Progression Towards Functional goals/ Treatment Progress Update:  [] Patient is progressing as expected towards functional goals listed. [] Progression is slowed due to complexities/Impairments listed. [] Progression has been slowed due to co-morbidities.   [x] Plan just implemented, too soon to assess goals progression <30days   [] Goals require adjustment due to lack of progress  [] Patient is not progressing as expected and requires additional follow up with physician  [] Other    ASSESSMENT:  See eval    Treatment/Activity Tolerance:  [] Patient tolerated treatment well [] Patient limited by fatique  [x] Patient limited by pain  [] Patient limited by other medical complications  [] Other:     Prognosis: [x] Good [] Fair  [] Poor    Patient Requires Follow-up: [x] Yes  [] No    PLAN: See eval  [x] Continue per plan of care [] Alter current plan (see comments)  [x] Plan of care initiated [] Hold pending MD visit [] Discharge    Electronically signed by: Suasn Anglin PT, MS, OMT-C    Physical Therapist [place] license #[id number]  Physical Therapist [place] license #[id number]    Note: If patient does not return for scheduled/ recommended follow up visits, this note will serve as a discharge from care along with most recent update on progress.

## 2022-07-18 NOTE — CARE COORDINATION
Called patient for updates and follow up from hospitalization. . Left message for return call.    Phyllis Arteaga BSN  Care Transition Nurse for ortho bundle  [phone]

## 2022-07-20 ENCOUNTER — CARE COORDINATION (OUTPATIENT)
Dept: CASE MANAGEMENT | Age: 72
End: 2022-07-20

## 2022-07-20 ENCOUNTER — TREATMENT (OUTPATIENT)
Dept: PHYSICAL THERAPY | Age: 72
End: 2022-07-20
Payer: MEDICARE

## 2022-07-20 DIAGNOSIS — R26.9 GAIT DIFFICULTY: ICD-10-CM

## 2022-07-20 DIAGNOSIS — R29.898 WEAKNESS OF EXTREMITY: ICD-10-CM

## 2022-07-20 DIAGNOSIS — G89.29 CHRONIC PAIN OF RIGHT KNEE: ICD-10-CM

## 2022-07-20 DIAGNOSIS — M25.561 CHRONIC PAIN OF RIGHT KNEE: ICD-10-CM

## 2022-07-20 DIAGNOSIS — M17.11 OSTEOARTHRITIS OF RIGHT KNEE, UNSPECIFIED OSTEOARTHRITIS TYPE: Primary | ICD-10-CM

## 2022-07-20 PROCEDURE — 97110 THERAPEUTIC EXERCISES: CPT | Performed by: PHYSICAL THERAPIST

## 2022-07-20 PROCEDURE — 97530 THERAPEUTIC ACTIVITIES: CPT | Performed by: PHYSICAL THERAPIST

## 2022-07-20 NOTE — CARE COORDINATION
Spoke with patient. Incision status: States free from redness or drainage. Edema/Swelling: States swelling continues off and on. Pain level and status: States pain is tolerable. Bowels: No complaints. Outpatient therapy: Continues. Do you have all of your medications: Yes    Changes in medications: Yes, started on amiodarone and eliquis.     Follow up appointments:    Future Appointments   Date Time Provider Ana María Rodriguez   7/25/2022  9:15 AM LEA Mazariegos PT [facility]   7/27/2022  8:30 AM LEA Mazariegos PT [facility]   8/8/2022  9:15 AM Mariaelena Simons MD [facility]     Loyd Malik BSN  Care Transition Nurse for ortho bundle  [phone]

## 2022-07-20 NOTE — PROGRESS NOTES
[facility]   Phone: [phone]    Fax: [phone]      Physical Therapy Treatment Note/ Progress Report:       Date:  2022    Patient Name:  Kirt Walker    :  1950  MRN: [id number]  Restrictions/Precautions:    Medical/Treatment Diagnosis Information:  Diagnosis: R TKA   DOS: R TKA on 22      Treating Diagnosis: limited gt skills, weakness in R LE, pain in knee, edema in R knee, limited ROM; Insurance/Certification information:  PT Insurance Information: medicare/[employer]  Physician Information:  Referring Practitioner: Dr. Key Barillas  Has the plan of care been signed (Y/N):        []  Yes  [x]  No     Date of Patient follow up with Physician: not scheduled      Is this a Progress Report:     []  Yes  [x]  No        If Yes:  Date Range for reporting period:  Beginning22  Ending 22    Progress report will be due (10 Rx or 30 days whichever is less):       Recertification will be due (POC Duration  / 90 days whichever is less): 22         Visit # Insurance Allowable Auth Required   1 25 []  Yes []  No        Functional Scale: FOTO 67    Date assessed:  22      Latex Allergy:  [x]NO      []YES  Preferred Language for Healthcare:   [x]English       []other:    Pain level:  3-4/10 w/ OTC mediation only     SUBJECTIVE:  3 weeks s/p surg  Pt states some swelling in knee after last session. He has been sore still in knee. He states that he walks a lot.     OBJECTIVE: See eval  Observation:   22 /78 and   22 /75 and HR 84     Flexibility L R Comment   Hamstring Min limitations Mod limitations 2022   Gastroc Mod limitations Mod limitations     ITB         Quad                         ROM LEFT RIGHT   HIP Flex       HIP Abd       HIP Ext       HIP IR       HIP ER       Knee ext   -2(-10)   Knee Flex   105 deg(95 on ERMI)   Ankle PF       Ankle DF       Ankle In       Ankle Ev       Strength LEFT RIGHT   HIP Flexors       HIP Abductors       HIP Ext       Hip ER       Knee EXT (quad) good Fair+   Knee Flex (HS)       Ankle DF       Ankle PF       Ankle Inv       Ankle EV               Circumference  Mid apex  7 cm prox       39 cm  41 cm    43 cm  45cm         Reflexes/Sensation:              []Dermatomes/Myotomes intact              []Reflexes equal and normal bilaterally              [x]Other: 7/18/22 deferred due to surg; intact to light touch;     Joint mobility: 7/18/22 deferred due to surg;              []Normal                      []Hypo              []Hyper     Palpation: general pain around knee w/ warmth related to edema in R LE extending into lower leg;  dec patella mobility; Functional Mobility/Transfers: extended time needed to transfer from standing to/from sitting and sitting to/from supine;     Posture: ectomorph body structure;      Bandages/Dressings/Incisions: incision healing well w/o symptoms or signs of infection. No drainage;     Gait: (include devices/WB status) amb w/ slight limp on R LE w/ SPC in R UE & shortened stride length;      Test measurements:      RESTRICTIONS/PRECAUTIONS: DM, cardiac ablation on 7/14/22 ; R TKA on 6/28/22    Exercises/Interventions:   Exercise/Equipment Resistance/Repetitions Other comments   Cardio/Warm-up     Bike Oscillations for ROM x5'    Treadmill          Stretching     Hamstring Seated 10\"x10    Hip Flexion     ITB     Grion     Quad     Inclined Calf On floor 10\"x10    Towel Pull          ROM     Passive ERMI 10\"x10    Active     Weight Shift     Weight Hangs     Sheet Pulls     Ankle Pumps           Patellar Glides     Medial     Superior     Inferior          STRENGTH     SLR     Supine Over foam roll 3x10    Prone     Abduction Side lying 3x10    Adducton PS + glut set 10\"x10    SLR+          Isometrics     Quad sets 10\"x10 over foam roll          CKC     Calf raises 2x10    Wall sits     Step ups     1 leg stand     Squatting Mini x10 CC TKE 25# 3x10    Balance          PRE     Extension x30 @EOB RANGE:   Flexion  RANGE:   Leg Press  RANGE:        Cable Column     Ed given on POC, expectations and goals 10'; 7/20/22 reviewed         Manual/Modalities                 Therapeutic Exercise and NMR EXR  [x] (48794) Provided verbal/tactile cueing for activities related to strengthening, flexibility, endurance, ROM for improvements in LE, proximal hip, and core control with self care, mobility, lifting, ambulation.  [] (13824) Provided verbal/tactile cueing for activities related to improving balance, coordination, kinesthetic sense, posture, motor skill, proprioception  to assist with LE, proximal hip, and core control in self care, mobility, lifting, ambulation and eccentric single leg control. NMR and Therapeutic Activities:    [] (77477 or 36989) Provided verbal/tactile cueing for activities related to improving balance, coordination, kinesthetic sense, posture, motor skill, proprioception and motor activation to allow for proper function of core, proximal hip and LE with self care and ADLs  [] (39125) Gait Re-education- Provided training and instruction to the patient for proper LE, core and proximal hip recruitment and positioning and eccentric body weight control with ambulation re-education including up and down stairs     Home Exercise Program:    [x] (86772) Reviewed/Progressed HEP activities related to strengthening, flexibility, endurance, ROM of core, proximal hip and LE for functional self-care, mobility, lifting and ambulation/stair navigation            Written HEP est    Access Code: [email or web address]  URL: [email or web address]  Date: 07/18/2022  Prepared by: Randy Bazzi  Gastroc Stretch on Wall - 2-3 x daily - 7 x weekly - 1 sets - 10 reps - 10 hold  Seated Table Hamstring Stretch - 2-3 x daily - 7 x weekly - 1 sets - 10 reps - 10 hold  Supine Quad Set - 2-3 x daily - 7 x weekly - 1 sets - 10 reps - 10 hold  Small Range Straight Leg Raise - 2-3 x daily - 7 x weekly - 3 sets - 10 reps  Sidelying Hip Abduction - 2-3 x daily - 7 x weekly - 3 sets - 10 reps  Standing Terminal Knee Extension with Resistance - 2-3 x daily - 7 x weekly - 1 sets - 10 reps - 10 hold  Sitting Heel Slide with Towel - 2-3 x daily - 7 x weekly - 1 sets - 10 reps - 10 hold      [] (37499)Reviewed/Progressed HEP activities related to improving balance, coordination, kinesthetic sense, posture, motor skill, proprioception of core, proximal hip and LE for self care, mobility, lifting, and ambulation/stair navigation      Manual Treatments:  PROM / STM / Oscillations-Mobs:  G-I, II, III, IV (PA's, Inf., Post.)  [] (77855) Provided manual therapy to mobilize LE, proximal hip and/or LS spine soft tissue/joints for the purpose of modulating pain, promoting relaxation,  increasing ROM, reducing/eliminating soft tissue swelling/inflammation/restriction, improving soft tissue extensibility and allowing for proper ROM for normal function with self care, mobility, lifting and ambulation. Modalities:  CP x10 min in elevation   [] GAME READY (VASO)- for significant edema, swelling, pain control. Charges:  Timed Code Treatment Minutes: 40   Total Treatment Minutes: 70     [] EVAL (LOW) 86970 (typically 20 minutes face-to-face)  [] EVAL (MOD) 60464 (typically 30 minutes face-to-face)  [] EVAL (HIGH) 03113 (typically 45 minutes face-to-face)  [] RE-EVAL   [x] XW(39739) 2 x  30'   [] IONTO  [] NMR (67719) x     [] VASO  [] Manual (31368) x      [] Other:  [x] TA (76146) 1 x   10'   [] Mech Traction (16949)  [] ES(attended) (18317)      [] ES (un) (15418):     GOALS:  Patient stated goal: to return to work as a  w/ squatting and lifting  [] Progressing: [] Met: [] Not Met: [] Adjusted     Therapist goals for Patient:  Short Term Goals: To be achieved in: 2 weeks  1. Independent in HEP and progression per patient tolerance, in order to prevent re-injury.   [] Progressing: [] Met: [] Not Met: [] Adjusted  2. Patient will have a decrease in pain to facilitate improvement in movement, function, and ADLs as indicated by Functional Deficits. [] Progressing: [] Met: [] Not Met: [] Adjusted     Long Term Goals: To be achieved in: 12 weeks  1. Functional index score of 85 or more for FOTO to assist with reaching prior level of function. [] Progressing: [] Met: [] Not Met: [] Adjusted  2. Patient will demonstrate increased AROM to 0-125 to allow for proper joint functioning as indicated by patients Functional Deficits. [] Progressing: [] Met: [] Not Met: [] Adjusted  3. Patient will demonstrate an increase in Strength to good proximal hip strength and control, within 5lb HHD in LE to allow for proper functional mobility as indicated by patients Functional Deficits. [] Progressing: [] Met: [] Not Met: [] Adjusted  4. Patient will return to 90% functional activities without increased symptoms or restriction. [] Progressing: [] Met: [] Not Met: [] Adjusted  5. Pt will be able to walk and stand for 8 hr/day to return to work. (patient specific functional goal)    [] Progressing: [] Met: [] Not Met: [] Adjusted         Overall Progression Towards Functional goals/ Treatment Progress Update:  [] Patient is progressing as expected towards functional goals listed. [] Progression is slowed due to complexities/Impairments listed. [] Progression has been slowed due to co-morbidities. [x] Plan just implemented, too soon to assess goals progression <30days   [] Goals require adjustment due to lack of progress  [] Patient is not progressing as expected and requires additional follow up with physician  [] Other    ASSESSMENT: ROM in R knee progressing steadily. Strength in quads progressing. Gt skills improved as knee ext motion progressed. Pt is managing pain well and edema is remaining steady.      Treatment/Activity Tolerance:  [] Patient tolerated treatment well [] Patient limited by vick  [x] Patient limited by pain  [] Patient limited by other medical complications  [] Other:     Prognosis: [x] Good [] Fair  [] Poor    Patient Requires Follow-up: [x] Yes  [] No    PLAN: Cont therapy for rehab after R TKA. [x] Continue per plan of care [] Alter current plan (see comments)  [x] Plan of care initiated [] Hold pending MD visit [] Discharge    Electronically signed by: Lanie Guevara, PT, MS, OMT-C    Physical Therapist [place] license #[id number]  Physical Therapist [place] license #[id number]    Note: If patient does not return for scheduled/ recommended follow up visits, this note will serve as a discharge from care along with most recent update on progress.

## 2022-07-25 ENCOUNTER — TREATMENT (OUTPATIENT)
Dept: PHYSICAL THERAPY | Age: 72
End: 2022-07-25
Payer: MEDICARE

## 2022-07-25 DIAGNOSIS — M17.11 OSTEOARTHRITIS OF RIGHT KNEE, UNSPECIFIED OSTEOARTHRITIS TYPE: Primary | ICD-10-CM

## 2022-07-25 DIAGNOSIS — G89.29 CHRONIC PAIN OF RIGHT KNEE: ICD-10-CM

## 2022-07-25 DIAGNOSIS — M25.561 CHRONIC PAIN OF RIGHT KNEE: ICD-10-CM

## 2022-07-25 DIAGNOSIS — R29.898 WEAKNESS OF EXTREMITY: ICD-10-CM

## 2022-07-25 DIAGNOSIS — R26.9 GAIT DIFFICULTY: ICD-10-CM

## 2022-07-25 PROCEDURE — 97110 THERAPEUTIC EXERCISES: CPT | Performed by: PHYSICAL THERAPIST

## 2022-07-25 PROCEDURE — 97530 THERAPEUTIC ACTIVITIES: CPT | Performed by: PHYSICAL THERAPIST

## 2022-07-25 NOTE — PROGRESS NOTES
[facility]   Phone: [phone]    Fax: [phone]      Physical Therapy Treatment Note/ Progress Report:       Date:  2022    Patient Name:  Whitney Smith    :  1950  MRN: [id number]  Restrictions/Precautions:    Medical/Treatment Diagnosis Information:  Diagnosis: R TKA   DOS: R TKA on 22      Treating Diagnosis: limited gt skills, weakness in R LE, pain in knee, edema in R knee, limited ROM; Insurance/Certification information:  PT Insurance Information: medicare/[employer]  Physician Information:  Referring Practitioner: Dr. Haris Arreola  Has the plan of care been signed (Y/N):        []  Yes  [x]  No     Date of Patient follow up with Physician: not scheduled      Is this a Progress Report:     []  Yes  [x]  No        If Yes:  Date Range for reporting period:  Beginning22  Ending 22    Progress report will be due (10 Rx or 30 days whichever is less):       Recertification will be due (POC Duration  / 90 days whichever is less): 22         Visit # Insurance Allowable Auth Required   1 25 []  Yes []  No        Functional Scale: FOTO 67    Date assessed:  22      Latex Allergy:  [x]NO      []YES  Preferred Language for Healthcare:   [x]English       []other:    Pain level:  2-3/10 w/ OTC mediation only     SUBJECTIVE:  3+ weeks s/p surg  Pt states some swelling in knee. Pt is controlling pain well w/ activity modifications and icing. BP been running good.     OBJECTIVE: See eval  Observation: no signs or symptoms of infection; general redness noted in R LE vs L LE generally but no warmth; amb w/ slight limp on R LE w/o AD and symmetry in stride length;  22 /78 and   22 /75 and HR 84     Flexibility L R Comment   Hamstring Min limitations Mod limitations 2022   Gastroc Mod limitations Mod limitations     ITB         Quad                         ROM LEFT RIGHT   HIP Flex   22    HIP Abd HIP Ext       HIP IR       HIP ER       Knee ext   -0(-10)   Knee Flex   112 deg(95 on ERMI)   Ankle PF       Ankle DF       Ankle In       Ankle Ev       Strength LEFT RIGHT   HIP Flexors       HIP Abductors       HIP Ext       Hip ER       Knee EXT (quad) good Fair+   Knee Flex (HS)       Ankle DF       Ankle PF       Ankle Inv       Ankle EV               Circumference  Mid apex  7 cm prox       39 cm  41 cm    43 cm  45cm         Reflexes/Sensation:              []Dermatomes/Myotomes intact              []Reflexes equal and normal bilaterally              [x]Other: 7/18/22 deferred due to surg; intact to light touch;     Joint mobility: 7/18/22 deferred due to surg;              []Normal                      []Hypo              []Hyper     Palpation: general pain around knee w/ warmth related to edema in R LE extending into lower leg;  dec patella mobility; Functional Mobility/Transfers: extended time needed to transfer from standing to/from sitting and sitting to/from supine;     Posture: ectomorph body structure;      Bandages/Dressings/Incisions: incision healing well w/o symptoms or signs of infection. No drainage;     Gait: (include devices/WB status) amb w/ slight limp on R LE w/ SPC in R UE & shortened stride length;      Test measurements:      RESTRICTIONS/PRECAUTIONS: DM, cardiac ablation on 7/14/22 ; R TKA on 6/28/22    Exercises/Interventions:   Exercise/Equipment Resistance/Repetitions Other comments   Cardio/Warm-up     Bike Oscillations for ROM x5'    Treadmill          Stretching     Hamstring Seated 10\"x10    Hip Flexion     ITB     Grion     Quad     Inclined Calf On floor 10\"x10    Towel Pull          ROM     Passive ERMI 10\"x10    Active     Weight Shift     Weight Hangs     Sheet Pulls     Ankle Pumps           Patellar Glides     Medial     Superior     Inferior          STRENGTH     SLR     Supine Over foam roll 3x10 1#    Prone     Abduction Side lying 3x10 1#    Adducton PS + bridging 2x10    SLR+          Isometrics     Quad sets 10\"x10 over foam roll          CKC     Calf raises 3x10    Wall sits     Step ups & over L1 x15    1 leg stand     Squatting Mini x10    CC TKE 25# 3x10    Balance Bi[employer]x L11 x3' POS         PRE     Extension x30 @EOB RANGE:   Flexion  RANGE:   Leg Press  RANGE:        Cable Column     Ed given on POC, expectations and goals 10'; 7/20/22 reviewed         Manual/Modalities                 Therapeutic Exercise and NMR EXR  [x] (07372) Provided verbal/tactile cueing for activities related to strengthening, flexibility, endurance, ROM for improvements in LE, proximal hip, and core control with self care, mobility, lifting, ambulation.  [] (97083) Provided verbal/tactile cueing for activities related to improving balance, coordination, kinesthetic sense, posture, motor skill, proprioception  to assist with LE, proximal hip, and core control in self care, mobility, lifting, ambulation and eccentric single leg control. NMR and Therapeutic Activities:    [] (87549 or 60253) Provided verbal/tactile cueing for activities related to improving balance, coordination, kinesthetic sense, posture, motor skill, proprioception and motor activation to allow for proper function of core, proximal hip and LE with self care and ADLs  [] (13331) Gait Re-education- Provided training and instruction to the patient for proper LE, core and proximal hip recruitment and positioning and eccentric body weight control with ambulation re-education including up and down stairs     Home Exercise Program:    [x] (42198) Reviewed/Progressed HEP activities related to strengthening, flexibility, endurance, ROM of core, proximal hip and LE for functional self-care, mobility, lifting and ambulation/stair navigation            Written HEP est    Access Code: [email or web address]  URL: [email or web address]  Date: 07/18/2022  Prepared by: Jay Rowe    Exercises  Gastroc Stretch on Wall - 2-3 x daily - 7 x weekly - 1 sets - 10 reps - 10 hold  Seated Table Hamstring Stretch - 2-3 x daily - 7 x weekly - 1 sets - 10 reps - 10 hold  Supine Quad Set - 2-3 x daily - 7 x weekly - 1 sets - 10 reps - 10 hold  Small Range Straight Leg Raise - 2-3 x daily - 7 x weekly - 3 sets - 10 reps  Sidelying Hip Abduction - 2-3 x daily - 7 x weekly - 3 sets - 10 reps  Standing Terminal Knee Extension with Resistance - 2-3 x daily - 7 x weekly - 1 sets - 10 reps - 10 hold  Sitting Heel Slide with Towel - 2-3 x daily - 7 x weekly - 1 sets - 10 reps - 10 hold      [] (99157)Reviewed/Progressed HEP activities related to improving balance, coordination, kinesthetic sense, posture, motor skill, proprioception of core, proximal hip and LE for self care, mobility, lifting, and ambulation/stair navigation      Manual Treatments:  PROM / STM / Oscillations-Mobs:  G-I, II, III, IV (PA's, Inf., Post.)  [] (71830) Provided manual therapy to mobilize LE, proximal hip and/or LS spine soft tissue/joints for the purpose of modulating pain, promoting relaxation,  increasing ROM, reducing/eliminating soft tissue swelling/inflammation/restriction, improving soft tissue extensibility and allowing for proper ROM for normal function with self care, mobility, lifting and ambulation. Modalities:  CP x10 min in elevation   [] GAME READY (VASO)- for significant edema, swelling, pain control.      Charges:  Timed Code Treatment Minutes: 40   Total Treatment Minutes: 60     [] EVAL (LOW) 12230 (typically 20 minutes face-to-face)  [] EVAL (MOD) 44473 (typically 30 minutes face-to-face)  [] EVAL (HIGH) 55226 (typically 45 minutes face-to-face)  [] RE-EVAL   [x] LO(01031) 2 x  30'   [] IONTO  [] NMR (37087) x     [] VASO  [] Manual (88023) x      [] Other:  [x] TA (93705) 1 x   10'   [] Mech Traction (69785)  [] ES(attended) (59317)      [] ES (un) (94922):     GOALS:  Patient stated goal: to return to work as a  w/ squatting and lifting  [] Progressing: [] resistance training w/ ease. General redness in R LE but pt has very pale skin on L LE and no signs or symptoms of infection noted. Pt did well physically on steps w/o substitution. Pt did get SOB after steps so pt rested and breathing returned to normal rate and then pt continued w/ program. Pt is progressing well w/ rehab program.     Treatment/Activity Tolerance:  [] Patient tolerated treatment well [] Patient limited by fatique  [x] Patient limited by pain  [] Patient limited by other medical complications  [] Other:     Prognosis: [x] Good [] Fair  [] Poor    Patient Requires Follow-up: [x] Yes  [] No    PLAN: Cont therapy for rehab after R TKA. [x] Continue per plan of care [] Alter current plan (see comments)  [x] Plan of care initiated [] Hold pending MD visit [] Discharge    Electronically signed by: Ermelinda Rubin PT, MS, OMT-C    Physical Therapist [place] license #[id number]  Physical Therapist [place] license #[id number]    Note: If patient does not return for scheduled/ recommended follow up visits, this note will serve as a discharge from care along with most recent update on progress.

## 2022-07-26 ENCOUNTER — CARE COORDINATION (OUTPATIENT)
Dept: CASE MANAGEMENT | Age: 72
End: 2022-07-26

## 2022-07-27 ENCOUNTER — TREATMENT (OUTPATIENT)
Dept: PHYSICAL THERAPY | Age: 72
End: 2022-07-27
Payer: MEDICARE

## 2022-07-27 DIAGNOSIS — M17.11 OSTEOARTHRITIS OF RIGHT KNEE, UNSPECIFIED OSTEOARTHRITIS TYPE: Primary | ICD-10-CM

## 2022-07-27 DIAGNOSIS — R29.898 WEAKNESS OF EXTREMITY: ICD-10-CM

## 2022-07-27 DIAGNOSIS — R26.9 GAIT DIFFICULTY: ICD-10-CM

## 2022-07-27 DIAGNOSIS — M25.561 CHRONIC PAIN OF RIGHT KNEE: ICD-10-CM

## 2022-07-27 DIAGNOSIS — G89.29 CHRONIC PAIN OF RIGHT KNEE: ICD-10-CM

## 2022-07-27 PROCEDURE — 97110 THERAPEUTIC EXERCISES: CPT | Performed by: PHYSICAL THERAPIST

## 2022-07-27 PROCEDURE — 97530 THERAPEUTIC ACTIVITIES: CPT | Performed by: PHYSICAL THERAPIST

## 2022-07-27 NOTE — PROGRESS NOTES
[facility]   Phone: [phone]    Fax: [phone]      Physical Therapy Treatment Note/ Progress Report:       Date:  2022    Patient Name:  Barrett Garcia    :  1950  MRN: [id number]  Restrictions/Precautions:    Medical/Treatment Diagnosis Information:  Diagnosis: R TKA   DOS: R TKA on 22      Treating Diagnosis: limited gt skills, weakness in R LE, pain in knee, edema in R knee, limited ROM; Insurance/Certification information:  PT Insurance Information: medicare/[employer]  Physician Information:  Referring Practitioner: Dr. Louis Guillaume  Has the plan of care been signed (Y/N):        []  Yes  [x]  No     Date of Patient follow up with Physician: not scheduled      Is this a Progress Report:     []  Yes  [x]  No        If Yes:  Date Range for reporting period:  Beginning22  Ending 22    Progress report will be due (10 Rx or 30 days whichever is less): 0/09/10      Recertification will be due (POC Duration  / 90 days whichever is less): 22         Visit # Insurance Allowable Auth Required   4 25 []  Yes []  No        Functional Scale: FOTO 67    Date assessed:  22      Latex Allergy:  [x]NO      []YES  Preferred Language for Healthcare:   [x]English       []other:    Pain level:  2-3/10 w/ OTC mediation only     SUBJECTIVE:  4 weeks s/p surg  Pt states that he has having min symptoms in knee after therapy session. Denies change in edema. Pt states that cardio has been good but he has not checked BP recently.       OBJECTIVE: See eval  Observation: no signs or symptoms of infection; general redness noted in R LE vs L LE generally but no warmth; amb w/ slight limp on R LE w/o AD and symmetry in stride length;  22 /78 and   22 /75 and HR 84  22 /88 and HR 84     Flexibility L R Comment   Hamstring Min limitations Mod limitations 2022   Gastroc Mod limitations Mod limitations     ITB Quad                         ROM LEFT RIGHT   HIP Flex   7/27/22    HIP Abd       HIP Ext       HIP IR       HIP ER       Knee ext   -0(-10)   Knee Flex   117 deg(95 on ERMI)   Ankle PF       Ankle DF       Ankle In       Ankle Ev       Strength LEFT RIGHT   HIP Flexors       HIP Abductors       HIP Ext       Hip ER       Knee EXT (quad) good Fair+   Knee Flex (HS)       Ankle DF       Ankle PF       Ankle Inv       Ankle EV               Circumference  Mid apex  7 cm prox       39 cm  41 cm    43 cm  45cm         Reflexes/Sensation:              []Dermatomes/Myotomes intact              []Reflexes equal and normal bilaterally              [x]Other: 7/18/22 deferred due to surg; intact to light touch;     Joint mobility: 7/18/22 deferred due to surg;              []Normal                      []Hypo              []Hyper     Palpation: general pain around knee w/ warmth related to edema in R LE extending into lower leg;  dec patella mobility; Functional Mobility/Transfers: extended time needed to transfer from standing to/from sitting and sitting to/from supine;     Posture: ectomorph body structure;      Bandages/Dressings/Incisions: incision healing well w/o symptoms or signs of infection. No drainage;     Gait: (include devices/WB status) amb w/ slight limp on R LE w/ SPC in R UE & shortened stride length;      Test measurements:      RESTRICTIONS/PRECAUTIONS: DM, cardiac ablation on 7/14/22 ; R TKA on 6/28/22    Exercises/Interventions:   Exercise/Equipment Resistance/Repetitions Other comments   Cardio/Warm-up     Bike Oscillations for ROM x5' 7/27/22 progressing to full revolution   Treadmill          Stretching     Hamstring Seated 10\"x10    Hip Flexion     ITB     Grion     Quad     Inclined Calf   on incline 10\"x10    Towel Pull          ROM     Passive ERMI 10\"x10    Active     Weight Shift     Weight Hangs     Sheet Pulls     Ankle Pumps           Patellar Glides     Medial     Superior     Inferior STRENGTH     SLR     Supine Over foam roll 3x10 1#    Prone     Abduction Side lying 3x10 1#    Adducton PS + bridging 2x10    SLR+          Isometrics     Quad sets 10\"x10 over foam roll          CKC     Calf raises 3x10    Wall sits     Step ups & over L2 x15    1 leg stand     Squatting Mini 2x10    CC TKE 35# 3x10    Balance Biodex L11 x3' POS         PRE     Extension x30 @EOB 1# RANGE:   Flexion  RANGE:   Leg Press  RANGE:        Cable Column     Ed given on POC, expectations and goals 10'; 7/20/22 reviewed         Manual/Modalities                 Therapeutic Exercise and NMR EXR  [x] (77223) Provided verbal/tactile cueing for activities related to strengthening, flexibility, endurance, ROM for improvements in LE, proximal hip, and core control with self care, mobility, lifting, ambulation.  [] (92061) Provided verbal/tactile cueing for activities related to improving balance, coordination, kinesthetic sense, posture, motor skill, proprioception  to assist with LE, proximal hip, and core control in self care, mobility, lifting, ambulation and eccentric single leg control.      NMR and Therapeutic Activities:    [] (20578 or 85520) Provided verbal/tactile cueing for activities related to improving balance, coordination, kinesthetic sense, posture, motor skill, proprioception and motor activation to allow for proper function of core, proximal hip and LE with self care and ADLs  [] (31231) Gait Re-education- Provided training and instruction to the patient for proper LE, core and proximal hip recruitment and positioning and eccentric body weight control with ambulation re-education including up and down stairs     Home Exercise Program:    [x] (61710) Reviewed/Progressed HEP activities related to strengthening, flexibility, endurance, ROM of core, proximal hip and LE for functional self-care, mobility, lifting and ambulation/stair navigation            Written HEP est    Access Code: [email or web address]  URL: [email or web address]  Date: 07/18/2022  Prepared by: Sadie Velazquez    Exercises  Gastroc Stretch on Wall - 2-3 x daily - 7 x weekly - 1 sets - 10 reps - 10 hold  Seated Table Hamstring Stretch - 2-3 x daily - 7 x weekly - 1 sets - 10 reps - 10 hold  Supine Quad Set - 2-3 x daily - 7 x weekly - 1 sets - 10 reps - 10 hold  Small Range Straight Leg Raise - 2-3 x daily - 7 x weekly - 3 sets - 10 reps  Sidelying Hip Abduction - 2-3 x daily - 7 x weekly - 3 sets - 10 reps  Standing Terminal Knee Extension with Resistance - 2-3 x daily - 7 x weekly - 1 sets - 10 reps - 10 hold  Sitting Heel Slide with Towel - 2-3 x daily - 7 x weekly - 1 sets - 10 reps - 10 hold      [] (54745)Reviewed/Progressed HEP activities related to improving balance, coordination, kinesthetic sense, posture, motor skill, proprioception of core, proximal hip and LE for self care, mobility, lifting, and ambulation/stair navigation      Manual Treatments:  PROM / STM / Oscillations-Mobs:  G-I, II, III, IV (PA's, Inf., Post.)  [] (59773) Provided manual therapy to mobilize LE, proximal hip and/or LS spine soft tissue/joints for the purpose of modulating pain, promoting relaxation,  increasing ROM, reducing/eliminating soft tissue swelling/inflammation/restriction, improving soft tissue extensibility and allowing for proper ROM for normal function with self care, mobility, lifting and ambulation. Modalities:  CP x10 min in elevation   [] GAME READY (VASO)- for significant edema, swelling, pain control.      Charges:  Timed Code Treatment Minutes: 40   Total Treatment Minutes: 60     [] EVAL (LOW) 77833 (typically 20 minutes face-to-face)  [] EVAL (MOD) 42259 (typically 30 minutes face-to-face)  [] EVAL (HIGH) 44918 (typically 45 minutes face-to-face)  [] RE-EVAL   [x] PT(49857) 2 x  30'   [] IONTO  [] NMR (97900) x     [] VASO  [] Manual (23210) x      [] Other:  [x] TA (10021) 1 x   10'   [] Mech Traction (85027)  [] ES(attended) (01959) [] ES (un) (10908):     GOALS:  Patient stated goal: to return to work as a  w/ squatting and lifting  [] Progressing: [] Met: [] Not Met: [] Adjusted     Therapist goals for Patient:  Short Term Goals: To be achieved in: 2 weeks  1. Independent in HEP and progression per patient tolerance, in order to prevent re-injury. [] Progressing: [] Met: [] Not Met: [] Adjusted  2. Patient will have a decrease in pain to facilitate improvement in movement, function, and ADLs as indicated by Functional Deficits. [] Progressing: [] Met: [] Not Met: [] Adjusted     Long Term Goals: To be achieved in: 12 weeks  1. Functional index score of 85 or more for FOTO to assist with reaching prior level of function. [] Progressing: [] Met: [] Not Met: [] Adjusted  2. Patient will demonstrate increased AROM to 0-125 to allow for proper joint functioning as indicated by patients Functional Deficits. [] Progressing: [] Met: [] Not Met: [] Adjusted  3. Patient will demonstrate an increase in Strength to good proximal hip strength and control, within 5lb HHD in LE to allow for proper functional mobility as indicated by patients Functional Deficits. [] Progressing: [] Met: [] Not Met: [] Adjusted  4. Patient will return to 90% functional activities without increased symptoms or restriction. [] Progressing: [] Met: [] Not Met: [] Adjusted  5. Pt will be able to walk and stand for 8 hr/day to return to work. (patient specific functional goal)    [] Progressing: [] Met: [] Not Met: [] Adjusted         Overall Progression Towards Functional goals/ Treatment Progress Update:  [] Patient is progressing as expected towards functional goals listed. [] Progression is slowed due to complexities/Impairments listed. [] Progression has been slowed due to co-morbidities.   [x] Plan just implemented, too soon to assess goals progression <30days   [] Goals require adjustment due to lack of progress  [] Patient is not progressing as expected

## 2022-08-01 ENCOUNTER — TREATMENT (OUTPATIENT)
Dept: PHYSICAL THERAPY | Age: 72
End: 2022-08-01
Payer: MEDICARE

## 2022-08-01 DIAGNOSIS — M25.561 CHRONIC PAIN OF RIGHT KNEE: ICD-10-CM

## 2022-08-01 DIAGNOSIS — R26.9 GAIT DIFFICULTY: ICD-10-CM

## 2022-08-01 DIAGNOSIS — G89.29 CHRONIC PAIN OF RIGHT KNEE: ICD-10-CM

## 2022-08-01 DIAGNOSIS — M17.11 OSTEOARTHRITIS OF RIGHT KNEE, UNSPECIFIED OSTEOARTHRITIS TYPE: Primary | ICD-10-CM

## 2022-08-01 DIAGNOSIS — R29.898 WEAKNESS OF EXTREMITY: ICD-10-CM

## 2022-08-01 PROCEDURE — 97110 THERAPEUTIC EXERCISES: CPT | Performed by: PHYSICAL THERAPIST

## 2022-08-01 PROCEDURE — 97530 THERAPEUTIC ACTIVITIES: CPT | Performed by: PHYSICAL THERAPIST

## 2022-08-01 PROCEDURE — 97112 NEUROMUSCULAR REEDUCATION: CPT | Performed by: PHYSICAL THERAPIST

## 2022-08-01 NOTE — PROGRESS NOTES
[facility]   Phone: [phone]    Fax: [phone]      Physical Therapy Treatment Note/ Progress Report:       Date:  2022    Patient Name:  Yair Richmond    :  1950  MRN: [id number]  Restrictions/Precautions: cardiac concerns, recent surg,   Medical/Treatment Diagnosis Information:  Diagnosis: R TKA   DOS: R TKA on 22      Treating Diagnosis: limited gt skills, weakness in R LE, pain in knee, edema in R knee, limited ROM; Insurance/Certification information:  PT Insurance Information: medicare/[employer]  Physician Information:  Referring Practitioner: Dr. Sotero Blood  Has the plan of care been signed (Y/N):        []  Yes  [x]  No     Date of Patient follow up with Physician: not scheduled      Is this a Progress Report:     []  Yes  [x]  No        If Yes:  Date Range for reporting period:  Beginning22  Ending 22    Progress report will be due (10 Rx or 30 days whichever is less):       Recertification will be due (POC Duration  / 90 days whichever is less): 22         Visit # Insurance Allowable Auth Required   5 25 []  Yes []  No        Functional Scale: FOTO 67    Date assessed:  22      Latex Allergy:  [x]NO      []YES  Preferred Language for Healthcare:   [x]English       []other:    Pain level:  1-2/10 w/ OTC mediation only     SUBJECTIVE:  4+ weeks s/p surg  Pt states that he has having min to no symptoms in knee. Denies change in edema. Pt is staying active at home. Plans to RTW the end of Aug as  w/ 10-12 hr/days. Dicussed general activities during the day to keep endurance and stamina good for RTW without  over doing activities.       OBJECTIVE: See eval  Observation: no signs or symptoms of infection; general redness noted in R LE vs L LE generally but no warmth; amb w/ slight limp on R LE w/o AD and symmetry in stride length;  22 /78 and   22 /75 and HR 84  22 /88 and HR 84  8/1/22  /89 and HR 81     Flexibility L R Comment   Hamstring Min limitations Mod limitations 7/18/2022   Gastroc Mod limitations Mod limitations     ITB         Quad                         ROM LEFT RIGHT   HIP Flex   8/1/22    HIP Abd       HIP Ext       HIP IR       HIP ER       Knee ext   -0(-10)   Knee Flex   120 deg(95 on ERMI)   Ankle PF       Ankle DF       Ankle In       Ankle Ev       Strength LEFT RIGHT   HIP Flexors       HIP Abductors       HIP Ext       Hip ER       Knee EXT (quad) good Fair+   Knee Flex (HS)       Ankle DF       Ankle PF       Ankle Inv       Ankle EV               Circumference  Mid apex  7 cm prox       39 cm  41 cm    43 cm  45cm         Reflexes/Sensation:              []Dermatomes/Myotomes intact              []Reflexes equal and normal bilaterally              [x]Other: 7/18/22 deferred due to surg; intact to light touch; some numbness noted on lat R knee;     Joint mobility: 7/18/22 deferred due to surg;              []Normal                      []Hypo              []Hyper     Palpation: general pain around knee w/ warmth related to edema in R LE extending into lower leg;  dec patella mobility; Functional Mobility/Transfers: indep transfers w/o assistance or extra time needed;    Posture: ectomorph body structure;      Bandages/Dressings/Incisions: incision healing well w/o symptoms or signs of infection. No drainage; spot where stitch worked out closed and no redness noted;     Gait: (include devices/WB status) amb w/ slight limp on R LE w/o AD in R UE & shortened stride length;      Test measurements:      RESTRICTIONS/PRECAUTIONS: DM, cardiac ablation on 7/14/22 ; R TKA on 6/28/22    Exercises/Interventions:   Exercise/Equipment Resistance/Repetitions Other comments   Cardio/Warm-up     Bike Oscillations for ROM x5'    Treadmill          Stretching     Hamstring Seated 10\"x10    Hip Flexion     ITB     Grion     Quad     Inclined Calf  on incline 10\"x10 Towel Pull          ROM     Passive ERMI 10\"x10    Active     Weight Shift     Weight Hangs     Sheet Pulls     Ankle Pumps           Patellar Glides     Medial     Superior     Inferior          STRENGTH     SLR     Supine Over foam roll 3x10 2#    Prone     Abduction Side lying 3x10 2#    Adducton PS + bridging 2x10    SLR+          Isometrics     Quad sets 10\"x10 over foam roll          CKC     Calf raises 3x10 5#    Wall sits     Step ups & over L2 x15 Inc ht NPV   1 leg stand     Squatting    CC TKE 35# 3x10    Balance     SLS 20\"x3  1 finger needed for assistance    PRE     Extension X30 5# RANGE:   Flexion 3x10 B LE 35# RANGE:   Leg Press 3x10 B # RANGE:        Cable Column     Ed given on POC, expectations and goals 10'; 7/20/22 reviewed         Manual/Modalities               PQRS: Reviewed medication list with patient. No changes since last PT visit. (8/1/22)      Therapeutic Exercise and NMR EXR  [x] (45190) Provided verbal/tactile cueing for activities related to strengthening, flexibility, endurance, ROM for improvements in LE, proximal hip, and core control with self care, mobility, lifting, ambulation.  [] (51874) Provided verbal/tactile cueing for activities related to improving balance, coordination, kinesthetic sense, posture, motor skill, proprioception  to assist with LE, proximal hip, and core control in self care, mobility, lifting, ambulation and eccentric single leg control.      NMR and Therapeutic Activities:    [] (12048 or 64960) Provided verbal/tactile cueing for activities related to improving balance, coordination, kinesthetic sense, posture, motor skill, proprioception and motor activation to allow for proper function of core, proximal hip and LE with self care and ADLs  [] (38229) Gait Re-education- Provided training and instruction to the patient for proper LE, core and proximal hip recruitment and positioning and eccentric body weight control with ambulation re-education including up and down stairs     Home Exercise Program:    [x] (25458) Reviewed/Progressed HEP activities related to strengthening, flexibility, endurance, ROM of core, proximal hip and LE for functional self-care, mobility, lifting and ambulation/stair navigation            Written HEP est    Access Code: [email or web address]  URL: [email or web address]  Date: 07/18/2022  Prepared by: Jhon Craft    Exercises  Gastroc Stretch on Wall - 2-3 x daily - 7 x weekly - 1 sets - 10 reps - 10 hold  Seated Table Hamstring Stretch - 2-3 x daily - 7 x weekly - 1 sets - 10 reps - 10 hold  Supine Quad Set - 2-3 x daily - 7 x weekly - 1 sets - 10 reps - 10 hold  Small Range Straight Leg Raise - 2-3 x daily - 7 x weekly - 3 sets - 10 reps  Sidelying Hip Abduction - 2-3 x daily - 7 x weekly - 3 sets - 10 reps  Standing Terminal Knee Extension with Resistance - 2-3 x daily - 7 x weekly - 1 sets - 10 reps - 10 hold  Sitting Heel Slide with Towel - 2-3 x daily - 7 x weekly - 1 sets - 10 reps - 10 hold      [] (45221)Reviewed/Progressed HEP activities related to improving balance, coordination, kinesthetic sense, posture, motor skill, proprioception of core, proximal hip and LE for self care, mobility, lifting, and ambulation/stair navigation      Manual Treatments:  PROM / STM / Oscillations-Mobs:  G-I, II, III, IV (PA's, Inf., Post.)  [] (09288) Provided manual therapy to mobilize LE, proximal hip and/or LS spine soft tissue/joints for the purpose of modulating pain, promoting relaxation,  increasing ROM, reducing/eliminating soft tissue swelling/inflammation/restriction, improving soft tissue extensibility and allowing for proper ROM for normal function with self care, mobility, lifting and ambulation. Modalities:  CP x10 min in elevation   [] GAME READY (VASO)- for significant edema, swelling, pain control.      Charges:  Timed Code Treatment Minutes: 55   Total Treatment Minutes: 65     [] EVAL (LOW) 57212 (typically 20 minutes face-to-face)  [] EVAL (MOD) 11164 (typically 30 minutes face-to-face)  [] EVAL (HIGH) 72962 (typically 45 minutes face-to-face)  [] RE-EVAL   [x] TI(60957) 2 x  30'   [] IONTO  [x] NMR (75604) x 10'    [] VASO  [] Manual (21800) x      [] Other:  [x] TA (42623) 1 x   15'   [] Mech Traction (20863)  [] ES(attended) (76119)      [] ES (un) (93142):     GOALS:  Patient stated goal: to return to work as a  w/ squatting and lifting  [] Progressing: [] Met: [] Not Met: [] Adjusted     Therapist goals for Patient:  Short Term Goals: To be achieved in: 2 weeks  1. Independent in HEP and progression per patient tolerance, in order to prevent re-injury. [] Progressing: [] Met: [] Not Met: [] Adjusted  2. Patient will have a decrease in pain to facilitate improvement in movement, function, and ADLs as indicated by Functional Deficits. [] Progressing: [] Met: [] Not Met: [] Adjusted     Long Term Goals: To be achieved in: 12 weeks  1. Functional index score of 85 or more for FOTO to assist with reaching prior level of function. [] Progressing: [] Met: [] Not Met: [] Adjusted  2. Patient will demonstrate increased AROM to 0-125 to allow for proper joint functioning as indicated by patients Functional Deficits. [] Progressing: [] Met: [] Not Met: [] Adjusted  3. Patient will demonstrate an increase in Strength to good proximal hip strength and control, within 5lb HHD in LE to allow for proper functional mobility as indicated by patients Functional Deficits. [] Progressing: [] Met: [] Not Met: [] Adjusted  4. Patient will return to 90% functional activities without increased symptoms or restriction. [] Progressing: [] Met: [] Not Met: [] Adjusted  5.  Pt will be able to walk and stand for 8 hr/day to return to work. (patient specific functional goal)    [] Progressing: [] Met: [] Not Met: [] Adjusted         Overall Progression Towards Functional goals/ Treatment Progress Update:  [] Patient is progressing as expected towards functional goals listed. [] Progression is slowed due to complexities/Impairments listed. [] Progression has been slowed due to co-morbidities. [x] Plan just implemented, too soon to assess goals progression <30days   [] Goals require adjustment due to lack of progress  [] Patient is not progressing as expected and requires additional follow up with physician  [] Other    ASSESSMENT: Motion in R knee continues to improve. Progressed strength and function in R LE w/ good tolerance to advancements to machines w/ ease. Balance on single leg challenging for both L and R individually. Edema resolving in R LE w/ less redness noted in lower leg. Gt skills progressing w/o AD. Treatment/Activity Tolerance:  [] Patient tolerated treatment well [] Patient limited by fatique  [x] Patient limited by pain  [] Patient limited by other medical complications  [] Other:     Prognosis: [x] Good [] Fair  [] Poor    Patient Requires Follow-up: [x] Yes  [] No    PLAN: Cont therapy for rehab after R TKA. [x] Continue per plan of care [] Alter current plan (see comments)  [x] Plan of care initiated [] Hold pending MD visit [] Discharge    Electronically signed by: Juan Daniel Lozano, PT, MS, OMT-C    Physical Therapist [place] license #[id number]  Physical Therapist [place] license #[id number]    Note: If patient does not return for scheduled/ recommended follow up visits, this note will serve as a discharge from care along with most recent update on progress.

## 2022-08-02 ENCOUNTER — CARE COORDINATION (OUTPATIENT)
Dept: CASE MANAGEMENT | Age: 72
End: 2022-08-02

## 2022-08-02 NOTE — CARE COORDINATION
Spoke with patient. Incision status: States free from redness or drainage. Edema/Swelling: States swelling has improved. Pain level and status: States pain is well controlled. Bowels: No complaints. Outpatient therapy: Continues. Do you have all of your medications: Yes    Changes in medications: No    Follow up appointments:    Future Appointments   Date Time Provider Ana María Rodriguez   8/3/2022  8:30 AM Debbie Enriquez PT [facility]   8/8/2022  9:15 AM Ruma Montanez MD [facility]   8/8/2022  1:00 PM Debbie Enriquez PT [facility]   8/10/2022  8:30 AM Debbie Enriquez PT [facility]     . tj

## 2022-08-03 ENCOUNTER — TREATMENT (OUTPATIENT)
Dept: PHYSICAL THERAPY | Age: 72
End: 2022-08-03
Payer: MEDICARE

## 2022-08-03 DIAGNOSIS — R26.9 GAIT DIFFICULTY: ICD-10-CM

## 2022-08-03 DIAGNOSIS — G89.29 CHRONIC PAIN OF RIGHT KNEE: ICD-10-CM

## 2022-08-03 DIAGNOSIS — M17.11 OSTEOARTHRITIS OF RIGHT KNEE, UNSPECIFIED OSTEOARTHRITIS TYPE: Primary | ICD-10-CM

## 2022-08-03 DIAGNOSIS — R29.898 WEAKNESS OF EXTREMITY: ICD-10-CM

## 2022-08-03 DIAGNOSIS — M25.561 CHRONIC PAIN OF RIGHT KNEE: ICD-10-CM

## 2022-08-03 PROCEDURE — 97530 THERAPEUTIC ACTIVITIES: CPT | Performed by: PHYSICAL THERAPIST

## 2022-08-03 PROCEDURE — 97112 NEUROMUSCULAR REEDUCATION: CPT | Performed by: PHYSICAL THERAPIST

## 2022-08-03 PROCEDURE — 97110 THERAPEUTIC EXERCISES: CPT | Performed by: PHYSICAL THERAPIST

## 2022-08-03 NOTE — PROGRESS NOTES
[facility]   Phone: [phone]    Fax: [phone]      Physical Therapy Treatment Note/ Progress Report:       Date:  8/3/2022    Patient Name:  Rehan Santos    :  1950  MRN: [id number]  Restrictions/Precautions: cardiac concerns, recent surg,   Medical/Treatment Diagnosis Information:  Diagnosis: R TKA   DOS: R TKA on 22      Treating Diagnosis: limited gt skills, weakness in R LE, pain in knee, edema in R knee, limited ROM; Insurance/Certification information:  PT Insurance Information: medicare/[employer]  Physician Information:  Referring Practitioner: Dr. Jose Juan Peter  Has the plan of care been signed (Y/N):        [x]  Yes  []  No     Date of Patient follow up with Physician: 22      Is this a Progress Report:     []  Yes  [x]  No        If Yes:  Date Range for reporting period:  Beginning22  Ending 22    Progress report will be due (10 Rx or 30 days whichever is less): 36      Recertification will be due (POC Duration  / 90 days whichever is less): 22         Visit # Insurance Allowable Auth Required   6 25 []  Yes []  No        Functional Scale: FOTO 67    Date assessed:  22      Latex Allergy:  [x]NO      []YES  Preferred Language for Healthcare:   [x]English       []other:    Pain level:  1-2/10 w/ OTC mediation only     SUBJECTIVE:  5 weeks s/p surg  Pt states that he had some stiffness the last few days. Pt denies any known inc in edema.        OBJECTIVE: See eval  Observation: no signs or symptoms of infection; general redness noted in R LE vs L LE generally but no warmth; amb w/ normal gt pattern w/o AD and symmetry in stride length;  22 /78 and   22 /75 and HR 84  22 /88 and HR 84  22  /89 and HR 81     Flexibility L R Comment   Hamstring Min limitations Mod limitations 8/3/2022   Gastroc Mod limitations Min limitations     ITB [employer] ROM LEFT RIGHT   HIP Flex   8/3/22    HIP Abd       HIP Ext       HIP IR       HIP ER       Knee ext   0(-10)   Knee Flex   125 deg(95 on ERMI)   Ankle PF       Ankle DF       Ankle In       Ankle Ev       Strength LEFT RIGHT   HIP Flexors       HIP Abductors       HIP Ext       Hip ER       Knee EXT (quad) good Fair+   Knee Flex (HS)       Ankle DF       Ankle PF       Ankle Inv       Ankle EV               Circumference  Mid apex  7 cm prox       39 cm  41 cm    43 cm  45cm         Reflexes/Sensation:              []Dermatomes/Myotomes intact              []Reflexes equal and normal bilaterally              [x]Other: 7/18/22 deferred due to surg; intact to light touch; some numbness noted on lat R knee;     Joint mobility: 7/18/22 deferred due to surg;              []Normal                      []Hypo              []Hyper     Palpation: general pain around knee w/ warmth related to edema in R LE extending into lower leg;  dec patella mobility; Functional Mobility/Transfers: indep transfers w/o assistance or extra time needed;    Posture: ectomorph body structure;      Bandages/Dressings/Incisions: incision healing well w/o symptoms or signs of infection. No drainage; adhesion noted in distal incision w/ ed given on scar massage, otherwise incision closed and healing well w/ complete closure;     Gait: (include devices/WB status) amb normal gt pattern w/o AD in R UE & symmetry in stride length;      Test measurements:      RESTRICTIONS/PRECAUTIONS: DM, cardiac ablation on 7/14/22 ; R TKA on 6/28/22    Exercises/Interventions:   Exercise/Equipment Resistance/Repetitions Other comments   Cardio/Warm-up     Bike Oscillations for ROM x5'    Treadmill          Stretching     Hamstring Seated 10\"x10    Hip Flexion     ITB     Grion     Quad     Inclined Calf  on incline 10\"x10    Towel Pull          ROM     Passive ERMI 10\"x10    Active     Weight Shift     Weight Hangs     Sheet Pulls     Ankle Pumps strengthening, flexibility, endurance, ROM of core, proximal hip and LE for functional self-care, mobility, lifting and ambulation/stair navigation            Written HEP est    Access Code: [email or web address]  URL: [email or web address]  Date: 07/18/2022  Prepared by: Flora Cruz    Exercises  Gastroc Stretch on Wall - 2-3 x daily - 7 x weekly - 1 sets - 10 reps - 10 hold  Seated Table Hamstring Stretch - 2-3 x daily - 7 x weekly - 1 sets - 10 reps - 10 hold  Supine Quad Set - 2-3 x daily - 7 x weekly - 1 sets - 10 reps - 10 hold  Small Range Straight Leg Raise - 2-3 x daily - 7 x weekly - 3 sets - 10 reps  Sidelying Hip Abduction - 2-3 x daily - 7 x weekly - 3 sets - 10 reps  Standing Terminal Knee Extension with Resistance - 2-3 x daily - 7 x weekly - 1 sets - 10 reps - 10 hold  Sitting Heel Slide with Towel - 2-3 x daily - 7 x weekly - 1 sets - 10 reps - 10 hold      [] (26757)Reviewed/Progressed HEP activities related to improving balance, coordination, kinesthetic sense, posture, motor skill, proprioception of core, proximal hip and LE for self care, mobility, lifting, and ambulation/stair navigation      Manual Treatments:  PROM / STM / Oscillations-Mobs:  G-I, II, III, IV (PA's, Inf., Post.)  [] (91508) Provided manual therapy to mobilize LE, proximal hip and/or LS spine soft tissue/joints for the purpose of modulating pain, promoting relaxation,  increasing ROM, reducing/eliminating soft tissue swelling/inflammation/restriction, improving soft tissue extensibility and allowing for proper ROM for normal function with self care, mobility, lifting and ambulation. Modalities:  CP x10 min in elevation   [] GAME READY (VASO)- for significant edema, swelling, pain control.      Charges:  Timed Code Treatment Minutes: 55   Total Treatment Minutes: 65     [] EVAL (LOW) 46981 (typically 20 minutes face-to-face)  [] EVAL (MOD) 66796 (typically 30 minutes face-to-face)  [] EVAL (HIGH) 71169 (typically 45 minutes face-to-face)  [] RE-EVAL   [x] EU(82607) 2 x  30'   [] IONTO  [x] NMR (57468) x 10'    [] VASO  [] Manual (03385) x      [] Other:  [x] TA (09800) 1 x   15'   [] Mech Traction (53988)  [] ES(attended) (69634)      [] ES (un) (59723):     GOALS:  Patient stated goal: to return to work as a  w/ squatting and lifting  [] Progressing: [] Met: [] Not Met: [] Adjusted     Therapist goals for Patient:  Short Term Goals: To be achieved in: 2 weeks  1. Independent in HEP and progression per patient tolerance, in order to prevent re-injury. [] Progressing: [] Met: [] Not Met: [] Adjusted  2. Patient will have a decrease in pain to facilitate improvement in movement, function, and ADLs as indicated by Functional Deficits. [] Progressing: [] Met: [] Not Met: [] Adjusted     Long Term Goals: To be achieved in: 12 weeks  1. Functional index score of 85 or more for FOTO to assist with reaching prior level of function. [] Progressing: [] Met: [] Not Met: [] Adjusted  2. Patient will demonstrate increased AROM to 0-125 to allow for proper joint functioning as indicated by patients Functional Deficits. [] Progressing: [] Met: [] Not Met: [] Adjusted  3. Patient will demonstrate an increase in Strength to good proximal hip strength and control, within 5lb HHD in LE to allow for proper functional mobility as indicated by patients Functional Deficits. [] Progressing: [] Met: [] Not Met: [] Adjusted  4. Patient will return to 90% functional activities without increased symptoms or restriction. [] Progressing: [] Met: [] Not Met: [] Adjusted  5. Pt will be able to walk and stand for 8 hr/day to return to work. (patient specific functional goal)    [] Progressing: [] Met: [] Not Met: [] Adjusted         Overall Progression Towards Functional goals/ Treatment Progress Update:  [] Patient is progressing as expected towards functional goals listed. [] Progression is slowed due to complexities/Impairments listed.   [] Progression has been slowed due to co-morbidities. [x] Plan just implemented, too soon to assess goals progression <30days   [] Goals require adjustment due to lack of progress  [] Patient is not progressing as expected and requires additional follow up with physician  [] Other    ASSESSMENT: pt did make ROM goals today w/ stretching and pain at end range. Pt did well w/ steps but did not inc ht due to soreness and stiffness next session. Pt handled program well w/ min challenge from exercises. Incision healing well. Pt is progressing well w/ rehab and is ahead of schedule w/ function. Treatment/Activity Tolerance:  [] Patient tolerated treatment well [] Patient limited by fatique  [x] Patient limited by pain  [] Patient limited by other medical complications  [] Other:     Prognosis: [x] Good [] Fair  [] Poor    Patient Requires Follow-up: [x] Yes  [] No    PLAN: Cont therapy for rehab after R TKA. [x] Continue per plan of care [] Alter current plan (see comments)  [x] Plan of care initiated [] Hold pending MD visit [] Discharge    Electronically signed by: Randy Glasgow PT, MS, OMT-C    Physical Therapist [place] license #[id number]  Physical Therapist [place] license #[id number]    Note: If patient does not return for scheduled/ recommended follow up visits, this note will serve as a discharge from care along with most recent update on progress.

## 2022-08-08 ENCOUNTER — TREATMENT (OUTPATIENT)
Dept: PHYSICAL THERAPY | Age: 72
End: 2022-08-08
Payer: MEDICARE

## 2022-08-08 DIAGNOSIS — M17.11 OSTEOARTHRITIS OF RIGHT KNEE, UNSPECIFIED OSTEOARTHRITIS TYPE: Primary | ICD-10-CM

## 2022-08-08 DIAGNOSIS — R26.9 GAIT DIFFICULTY: ICD-10-CM

## 2022-08-08 DIAGNOSIS — M25.561 CHRONIC PAIN OF RIGHT KNEE: ICD-10-CM

## 2022-08-08 DIAGNOSIS — G89.29 CHRONIC PAIN OF RIGHT KNEE: ICD-10-CM

## 2022-08-08 DIAGNOSIS — R29.898 WEAKNESS OF EXTREMITY: ICD-10-CM

## 2022-08-08 PROCEDURE — 97110 THERAPEUTIC EXERCISES: CPT | Performed by: PHYSICAL THERAPIST

## 2022-08-08 PROCEDURE — 97112 NEUROMUSCULAR REEDUCATION: CPT | Performed by: PHYSICAL THERAPIST

## 2022-08-08 PROCEDURE — 97530 THERAPEUTIC ACTIVITIES: CPT | Performed by: PHYSICAL THERAPIST

## 2022-08-08 NOTE — PROGRESS NOTES
[facility]   Phone: [phone]    Fax: [phone]      Physical Therapy Treatment Note/ Progress Report:       Date:  2022    Patient Name:  Sol Woody    :  1950  MRN: [id number]  Restrictions/Precautions: cardiac concerns, recent surg,   Medical/Treatment Diagnosis Information:  Diagnosis: R TKA   DOS: R TKA on 22      Treating Diagnosis: limited gt skills, weakness in R LE, pain in knee, edema in R knee, limited ROM; Insurance/Certification information:  PT Insurance Information: medicare/[employer]  Physician Information:  Referring Practitioner: Dr. Maricel Velasco  Has the plan of care been signed (Y/N):        [x]  Yes  []  No     Date of Patient follow up with Physician: 8/15/22      Is this a Progress Report:     []  Yes  [x]  No        If Yes:  Date Range for reporting period:  Beginning22  Ending 22    Progress report will be due (10 Rx or 30 days whichever is less): 3/09/95      Recertification will be due (POC Duration  / 90 days whichever is less): 22         Visit # Insurance Allowable Auth Required   7 25 []  Yes []  No        Functional Scale: ONQZ 87    Date assessed:  22      Latex Allergy:  [x]NO      []YES  Preferred Language for Healthcare:   [x]English       []other:    Pain level:  1-2/10 w/ OTC mediation only     SUBJECTIVE:  6 weeks s/p surg  Traffic on highway prevented pt from making his MD appt today so it was rescheduled for next Monday. Pt states that pain and edema are consistently low. Pt went to [facility] this weekend and did well on hike and walking on uneven surfaces.       OBJECTIVE: See eval  Observation: no signs or symptoms of infection; general redness noted in R LE vs L LE generally but no warmth; amb w/ normal gt pattern w/o AD and symmetry in stride length;  22 /78 and   22 /75 and HR 84  22 /88 and HR 84  22  /89 and HR 81  22 BP 140/82 and HR 87 after bike but before resistance exercises     Flexibility L R Comment   Hamstring Min limitations Mod limitations 8/3/2022   Gastroc Mod limitations Min limitations     ITB         Quad                         ROM LEFT RIGHT   HIP Flex   8/3/22    HIP Abd       HIP Ext       HIP IR       HIP ER       Knee ext   0(-10)   Knee Flex   125 deg(95 on ERMI)   Ankle PF       Ankle DF       Ankle In       Ankle Ev       Strength LEFT RIGHT   HIP Flexors       HIP Abductors       HIP Ext       Hip ER       Knee EXT (quad) good Fair+   Knee Flex (HS)       Ankle DF       Ankle PF       Ankle Inv       Ankle EV               Circumference  Mid apex  7 cm prox       39 cm  41 cm    43 cm  45cm         Reflexes/Sensation:              []Dermatomes/Myotomes intact              []Reflexes equal and normal bilaterally              [x]Other: 7/18/22 deferred due to surg; intact to light touch; some numbness noted on lat R knee;     Joint mobility: 7/18/22 deferred due to surg;              []Normal                      []Hypo              []Hyper     Palpation: general pain around knee w/ warmth related to edema in R LE extending into lower leg;  dec patella mobility; Functional Mobility/Transfers: indep transfers w/o assistance or extra time needed;    Posture: ectomorph body structure;      Bandages/Dressings/Incisions: incision healing well w/o symptoms or signs of infection. No drainage; adhesion noted in distal incision w/ ed given on scar massage, otherwise incision closed and healing well w/ complete closure;     Gait: (include devices/WB status) amb normal gt pattern w/o AD in R UE & symmetry in stride length;      Test measurements:      RESTRICTIONS/PRECAUTIONS: DM, cardiac ablation on 7/14/22 ; R TKA on 6/28/22    Exercises/Interventions:   Exercise/Equipment Resistance/Repetitions Other comments   Cardio/Warm-up     Bike Oscillations for ROM x5'    Treadmill          Stretching     Hamstring Seated 10\"x10    Hip Flexion     ITB     Grion     Quad     Inclined Calf  on incline 10\"x10    Towel Pull          ROM     Passive ERMI 10\"x10    Active     Weight Shift     Weight Hangs     Sheet Pulls     Ankle Pumps           Patellar Glides     Medial     Superior     Inferior          STRENGTH     SLR     Supine Over foam roll 3x10 2#    Prone     Abduction Side lying 3x10 2#    Adducton PS + bridging 2x10    SLR+          Isometrics     Quad sets 10\"x10 over foam roll          CKC     Calf raises 3x10 5#    Wall sits     Step ups & over L3 x15    1 leg stand     Squatting    CC TKE 35# 3x10    Balance Biodex L11 x3' POS     SLS 20\"x1 ea side  1 finger needed for assistance eo,ec    PRE     Extension X30 15# RANGE:   Flexion 3x10 B LE 35# B conc, R ecc RANGE:   Leg Press 3x10 B # RANGE:        SLR+ NPV              Cable Column     Ed given on POC, expectations and goals 10'; 7/20/22 reviewed         Manual/Modalities               PQRS: Reviewed medication list with patient. No changes since last PT visit. (8/8/22)      Therapeutic Exercise and NMR EXR  [x] (66205) Provided verbal/tactile cueing for activities related to strengthening, flexibility, endurance, ROM for improvements in LE, proximal hip, and core control with self care, mobility, lifting, ambulation.  [] (71835) Provided verbal/tactile cueing for activities related to improving balance, coordination, kinesthetic sense, posture, motor skill, proprioception  to assist with LE, proximal hip, and core control in self care, mobility, lifting, ambulation and eccentric single leg control.      NMR and Therapeutic Activities:    [] (00061 or 77516) Provided verbal/tactile cueing for activities related to improving balance, coordination, kinesthetic sense, posture, motor skill, proprioception and motor activation to allow for proper function of core, proximal hip and LE with self care and ADLs  [] (79787) Gait Re-education- Provided training and instruction to the patient for proper LE, core and proximal hip recruitment and positioning and eccentric body weight control with ambulation re-education including up and down stairs     Home Exercise Program:    [x] (21185) Reviewed/Progressed HEP activities related to strengthening, flexibility, endurance, ROM of core, proximal hip and LE for functional self-care, mobility, lifting and ambulation/stair navigation            Written HEP est    Access Code: [email or web address]  URL: [email or web address]  Date: 07/18/2022  Prepared by: Christel Ramos    Exercises  Gastroc Stretch on Wall - 2-3 x daily - 7 x weekly - 1 sets - 10 reps - 10 hold  Seated Table Hamstring Stretch - 2-3 x daily - 7 x weekly - 1 sets - 10 reps - 10 hold  Supine Quad Set - 2-3 x daily - 7 x weekly - 1 sets - 10 reps - 10 hold  Small Range Straight Leg Raise - 2-3 x daily - 7 x weekly - 3 sets - 10 reps  Sidelying Hip Abduction - 2-3 x daily - 7 x weekly - 3 sets - 10 reps  Standing Terminal Knee Extension with Resistance - 2-3 x daily - 7 x weekly - 1 sets - 10 reps - 10 hold  Sitting Heel Slide with Towel - 2-3 x daily - 7 x weekly - 1 sets - 10 reps - 10 hold      [] (08838)Reviewed/Progressed HEP activities related to improving balance, coordination, kinesthetic sense, posture, motor skill, proprioception of core, proximal hip and LE for self care, mobility, lifting, and ambulation/stair navigation      Manual Treatments:  PROM / STM / Oscillations-Mobs:  G-I, II, III, IV (PA's, Inf., Post.)  [] (90131) Provided manual therapy to mobilize LE, proximal hip and/or LS spine soft tissue/joints for the purpose of modulating pain, promoting relaxation,  increasing ROM, reducing/eliminating soft tissue swelling/inflammation/restriction, improving soft tissue extensibility and allowing for proper ROM for normal function with self care, mobility, lifting and ambulation.      Modalities:  CP x10 min in elevation   [] GAME READY (VASO)- for significant edema, swelling, pain control. Charges:  Timed Code Treatment Minutes: 55   Total Treatment Minutes: 65     [] EVAL (LOW) 15611 (typically 20 minutes face-to-face)  [] EVAL (MOD) 34075 (typically 30 minutes face-to-face)  [] EVAL (HIGH) 79279 (typically 45 minutes face-to-face)  [] RE-EVAL   [x] BR(03515) 2 x  30'   [] IONTO  [x] NMR (02469) x 10'    [] VASO  [] Manual (84883) x      [] Other:  [x] TA (69650) 1 x   15'   [] Mech Traction (70305)  [] ES(attended) (00554)      [] ES (un) (77776):     GOALS:  Patient stated goal: to return to work as a  w/ squatting and lifting  [] Progressing: [] Met: [] Not Met: [] Adjusted     Therapist goals for Patient:  Short Term Goals: To be achieved in: 2 weeks  1. Independent in HEP and progression per patient tolerance, in order to prevent re-injury. [] Progressing: [] Met: [] Not Met: [] Adjusted  2. Patient will have a decrease in pain to facilitate improvement in movement, function, and ADLs as indicated by Functional Deficits. [] Progressing: [] Met: [] Not Met: [] Adjusted     Long Term Goals: To be achieved in: 12 weeks  1. Functional index score of 85 or more for FOTO to assist with reaching prior level of function. [] Progressing: [] Met: [] Not Met: [] Adjusted  2. Patient will demonstrate increased AROM to 0-125 to allow for proper joint functioning as indicated by patients Functional Deficits. [] Progressing: [] Met: [] Not Met: [] Adjusted  3. Patient will demonstrate an increase in Strength to good proximal hip strength and control, within 5lb HHD in LE to allow for proper functional mobility as indicated by patients Functional Deficits. [] Progressing: [] Met: [] Not Met: [] Adjusted  4. Patient will return to 90% functional activities without increased symptoms or restriction. [] Progressing: [] Met: [] Not Met: [] Adjusted  5.  Pt will be able to walk and stand for 8 hr/day to return to work. (patient specific functional goal)    [] Progressing: [] Met: [] Not Met: [] Adjusted         Overall Progression Towards Functional goals/ Treatment Progress Update:  [] Patient is progressing as expected towards functional goals listed. [] Progression is slowed due to complexities/Impairments listed. [] Progression has been slowed due to co-morbidities. [x] Plan just implemented, too soon to assess goals progression <30days   [] Goals require adjustment due to lack of progress  [] Patient is not progressing as expected and requires additional follow up with physician  [] Other    ASSESSMENT: ROM continues to improve. BP was on the higher side today so pt took more rest breaks in between exercises. Pt progressed w/ resistance and step height today w/ ease. Pt did fatigue from program. Pt has plans to return to work in 2 weeks. Pt works 10-12 hr/days so therapy will address endurance component. Treatment/Activity Tolerance:  [] Patient tolerated treatment well [] Patient limited by fatique  [x] Patient limited by pain  [] Patient limited by other medical complications  [] Other:     Prognosis: [x] Good [] Fair  [] Poor    Patient Requires Follow-up: [x] Yes  [] No    PLAN: Cont therapy for rehab after R TKA. [x] Continue per plan of care [] Alter current plan (see comments)  [x] Plan of care initiated [] Hold pending MD visit [] Discharge    Electronically signed by: Lanie Guevara, PT, MS, OMT-C    Physical Therapist [place] license #[id number]  Physical Therapist [place] license #[id number]    Note: If patient does not return for scheduled/ recommended follow up visits, this note will serve as a discharge from care along with most recent update on progress.

## 2022-08-10 ENCOUNTER — TREATMENT (OUTPATIENT)
Dept: PHYSICAL THERAPY | Age: 72
End: 2022-08-10
Payer: MEDICARE

## 2022-08-10 DIAGNOSIS — M17.11 OSTEOARTHRITIS OF RIGHT KNEE, UNSPECIFIED OSTEOARTHRITIS TYPE: Primary | ICD-10-CM

## 2022-08-10 DIAGNOSIS — R29.898 WEAKNESS OF EXTREMITY: ICD-10-CM

## 2022-08-10 DIAGNOSIS — G89.29 CHRONIC PAIN OF RIGHT KNEE: ICD-10-CM

## 2022-08-10 DIAGNOSIS — R26.9 GAIT DIFFICULTY: ICD-10-CM

## 2022-08-10 DIAGNOSIS — M25.561 CHRONIC PAIN OF RIGHT KNEE: ICD-10-CM

## 2022-08-10 PROCEDURE — 97112 NEUROMUSCULAR REEDUCATION: CPT | Performed by: PHYSICAL THERAPIST

## 2022-08-10 PROCEDURE — 97110 THERAPEUTIC EXERCISES: CPT | Performed by: PHYSICAL THERAPIST

## 2022-08-10 PROCEDURE — 97530 THERAPEUTIC ACTIVITIES: CPT | Performed by: PHYSICAL THERAPIST

## 2022-08-10 NOTE — PROGRESS NOTES
[facility]   Phone: [phone]    Fax: [phone]      Physical Therapy Treatment Note/ Progress Report:       Date:  8/10/2022    Patient Name:  Maria Eugenia Perkins    :  1950  MRN: [id number]  Restrictions/Precautions: cardiac concerns, recent surg,   Medical/Treatment Diagnosis Information:  Diagnosis: R TKA   DOS: R TKA on 22      Treating Diagnosis: limited gt skills, weakness in R LE, pain in knee, edema in R knee, limited ROM; Insurance/Certification information:  PT Insurance Information: medicare/[employer]  Physician Information:  Referring Practitioner: Dr. Roe Merino  Has the plan of care been signed (Y/N):        [x]  Yes  []  No     Date of Patient follow up with Physician: 8/15/22      Is this a Progress Report:     []  Yes  [x]  No        If Yes:  Date Range for reporting period:  Beginning 22  Ending 22    Progress report will be due (10 Rx or 30 days whichever is less):       Recertification will be due (POC Duration  / 90 days whichever is less): 22         Visit # Insurance Allowable Auth Required   8 25 []  Yes []  No        Functional Scale: [id number] 92    Date assessed:  22      Latex Allergy:  [x]NO      []YES  Preferred Language for Healthcare:   [x]English       []other:    Pain level:  1-2/10 w/ OTC mediation only     SUBJECTIVE:  6 weeks s/p surg  Pt doing well w/ R knee w/ pain and edema control.        OBJECTIVE: See eval  Observation: no signs or symptoms of infection; general redness noted in R LE vs L LE generally but no warmth; amb w/ normal gt pattern w/o AD and symmetry in stride length;  22 /78 and   22 /75 and HR 84  22 /88 and HR 84  22  /89 and HR 81  22 /82 and HR 87 after bike but before resistance exercises  8/10/22 /85 and HR 81     REASSESS NPV  Flexibility L R Comment   Hamstring Min limitations Mod limitations 8/3/2022   Gastroc Mod limitations Min limitations     ITB         Quad                         ROM LEFT RIGHT   HIP Flex      HIP Abd       HIP Ext       HIP IR       HIP ER       Knee ext   0(-10)   Knee Flex   125 deg(95 on ERMI)   Ankle PF       Ankle DF       Ankle In       Ankle Ev       Strength LEFT RIGHT   HIP Flexors       HIP Abductors       HIP Ext       Hip ER       Knee EXT (quad) good Fair+   Knee Flex (HS)       Ankle DF       Ankle PF       Ankle Inv       Ankle EV               Circumference  Mid apex  7 cm prox       39 cm  41 cm    43 cm  45cm         Reflexes/Sensation:              []Dermatomes/Myotomes intact              []Reflexes equal and normal bilaterally              [x]Other: 7/18/22 deferred due to surg; intact to light touch; some numbness noted on lat R knee;     Joint mobility: 7/18/22 deferred due to surg;              []Normal                      []Hypo              []Hyper     Palpation: general pain around knee w/ warmth related to edema in R LE extending into lower leg;  dec patella mobility; Functional Mobility/Transfers: indep transfers w/o assistance or extra time needed;    Posture: ectomorph body structure;      Bandages/Dressings/Incisions: incision healing well w/o symptoms or signs of infection. No drainage; adhesion noted in distal incision w/ ed given on scar massage, otherwise incision closed and healing well w/ complete closure;     Gait: (include devices/WB status) amb normal gt pattern w/o AD in R UE & symmetry in stride length;      Test measurements:      RESTRICTIONS/PRECAUTIONS: DM, cardiac ablation on 7/14/22 ; R TKA on 6/28/22    Exercises/Interventions:   Exercise/Equipment Resistance/Repetitions Other comments   Cardio/Warm-up     Bike Oscillations for ROM x5'    Treadmill          Stretching     Hamstring Seated 10\"x10    Hip Flexion     ITB     Grion     Quad     Inclined Calf  on incline 10\"x10    Towel Pull          ROM     Passive ERMI 10\"x10    Active     Weight Shift     Weight Hangs     Sheet Pulls     Ankle Pumps           Patellar Glides     Medial     Superior     Inferior          STRENGTH     SLR     Supine Over foam roll 3x10 3#    Prone     Abduction Side lying 3x10 3#    Adducton PS + bridging 2x10    SLR+          Isometrics     Quad sets 10\"x10 over foam roll          CKC     Calf raises x50 5#    Wall sits     Step ups & over L3 x15    Lateral Step ups L3 x15    1 leg stand     Squatting To chair w/ airex x15    CC TKE 35# 3x10    Balance Biodex L9 x2 LOS     SLS 20\"x1 ea side  1 finger needed for assistance eo,ec, airex    PRE     Extension X30 20# RANGE:   Flexion 3x10 B LE 35# B conc, R ecc RANGE:   Leg Press 3x10 B # RANGE:        SLR+ 20\" +5 pumps=3         Ladder drill         Cable Column     Ed given on POC, expectations and goals 10'; 8/10/22 reviewed         Manual/Modalities               PQRS: Reviewed medication list with patient. No changes since last PT visit. (8/10/22)      Therapeutic Exercise and NMR EXR  [x] (31073) Provided verbal/tactile cueing for activities related to strengthening, flexibility, endurance, ROM for improvements in LE, proximal hip, and core control with self care, mobility, lifting, ambulation.  [] (63725) Provided verbal/tactile cueing for activities related to improving balance, coordination, kinesthetic sense, posture, motor skill, proprioception  to assist with LE, proximal hip, and core control in self care, mobility, lifting, ambulation and eccentric single leg control.      NMR and Therapeutic Activities:    [] (41159 or 60637) Provided verbal/tactile cueing for activities related to improving balance, coordination, kinesthetic sense, posture, motor skill, proprioception and motor activation to allow for proper function of core, proximal hip and LE with self care and ADLs  [] (36133) Gait Re-education- Provided training and instruction to the patient for proper LE, core and proximal hip recruitment and positioning and eccentric body weight control with ambulation re-education including up and down stairs     Home Exercise Program:    [x] (72905) Reviewed/Progressed HEP activities related to strengthening, flexibility, endurance, ROM of core, proximal hip and LE for functional self-care, mobility, lifting and ambulation/stair navigation            Written HEP est    Access Code: [email or web address]  URL: [email or web address]  Date: 07/18/2022  Prepared by: Karen Ji    Exercises  Gastroc Stretch on Wall - 2-3 x daily - 7 x weekly - 1 sets - 10 reps - 10 hold  Seated Table Hamstring Stretch - 2-3 x daily - 7 x weekly - 1 sets - 10 reps - 10 hold  Supine Quad Set - 2-3 x daily - 7 x weekly - 1 sets - 10 reps - 10 hold  Small Range Straight Leg Raise - 2-3 x daily - 7 x weekly - 3 sets - 10 reps  Sidelying Hip Abduction - 2-3 x daily - 7 x weekly - 3 sets - 10 reps  Standing Terminal Knee Extension with Resistance - 2-3 x daily - 7 x weekly - 1 sets - 10 reps - 10 hold  Sitting Heel Slide with Towel - 2-3 x daily - 7 x weekly - 1 sets - 10 reps - 10 hold      [] (64052)Reviewed/Progressed HEP activities related to improving balance, coordination, kinesthetic sense, posture, motor skill, proprioception of core, proximal hip and LE for self care, mobility, lifting, and ambulation/stair navigation      Manual Treatments:  PROM / STM / Oscillations-Mobs:  G-I, II, III, IV (PA's, Inf., Post.)  [] (05227) Provided manual therapy to mobilize LE, proximal hip and/or LS spine soft tissue/joints for the purpose of modulating pain, promoting relaxation,  increasing ROM, reducing/eliminating soft tissue swelling/inflammation/restriction, improving soft tissue extensibility and allowing for proper ROM for normal function with self care, mobility, lifting and ambulation. Modalities:  CP x10 min in elevation   [] GAME READY (VASO)- for significant edema, swelling, pain control.      Charges:  Timed Code Treatment Minutes: 54 Total Treatment Minutes: 65     [] EVAL (LOW) 79045 (typically 20 minutes face-to-face)  [] EVAL (MOD) 45295 (typically 30 minutes face-to-face)  [] EVAL (HIGH) 77533 (typically 45 minutes face-to-face)  [] RE-EVAL   [x] PM(73031) 2 x  30'   [] IONTO  [x] NMR (05441) x 10'    [] VASO  [] Manual (38210) x      [] Other:  [x] TA (80837) 1 x   15'   [] Mech Traction (05569)  [] ES(attended) (41636)      [] ES (un) (50561):     GOALS:  Patient stated goal: to return to work as a  w/ squatting and lifting  [] Progressing: [] Met: [] Not Met: [] Adjusted     Therapist goals for Patient:  Short Term Goals: To be achieved in: 2 weeks  1. Independent in HEP and progression per patient tolerance, in order to prevent re-injury. [] Progressing: [] Met: [] Not Met: [] Adjusted  2. Patient will have a decrease in pain to facilitate improvement in movement, function, and ADLs as indicated by Functional Deficits. [] Progressing: [] Met: [] Not Met: [] Adjusted     Long Term Goals: To be achieved in: 12 weeks  1. Functional index score of 85 or more for FOTO to assist with reaching prior level of function. [] Progressing: [] Met: [] Not Met: [] Adjusted  2. Patient will demonstrate increased AROM to 0-125 to allow for proper joint functioning as indicated by patients Functional Deficits. [x] Progressing: [x] Met: [] Not Met: [] Adjusted  3. Patient will demonstrate an increase in Strength to good proximal hip strength and control, within 5lb HHD in LE to allow for proper functional mobility as indicated by patients Functional Deficits. [x] Progressing: [] Met: [] Not Met: [] Adjusted  4. Patient will return to 90% functional activities without increased symptoms or restriction. [x] Progressing: [] Met: [] Not Met: [] Adjusted  5.  Pt will be able to walk and stand for 8 hr/day to return to work. (patient specific functional goal)    [] Progressing: [] Met: [] Not Met: [] Adjusted         Overall Progression Towards Functional goals/ Treatment Progress Update:  [x] Patient is progressing as expected towards functional goals listed. [] Progression is slowed due to complexities/Impairments listed. [] Progression has been slowed due to co-morbidities. [] Plan just implemented, too soon to assess goals progression <30days   [] Goals require adjustment due to lack of progress  [] Patient is not progressing as expected and requires additional follow up with physician  [] Other    ASSESSMENT: ROM reaching goals after stretching. Function progressing steadily w/ good tech for functional activities. Pt is handling exercises well / min fatigue. He does need rest breaks int to breathe due to mask and exertion w/ program. Progressed program for endurance to prepare for RTW in 2 weeks. Treatment/Activity Tolerance:  [] Patient tolerated treatment well [] Patient limited by fatique  [x] Patient limited by pain  [] Patient limited by other medical complications  [] Other:     Prognosis: [x] Good [] Fair  [] Poor    Patient Requires Follow-up: [x] Yes  [] No    PLAN: Cont therapy for rehab after R TKA. [x] Continue per plan of care [] Alter current plan (see comments)  [x] Plan of care initiated [] Hold pending MD visit [] Discharge    Electronically signed by: Stefany Ramirez, PT, MS, OMT-C    Physical Therapist [place] license #[id number]  Physical Therapist [place] license #[id number]    Note: If patient does not return for scheduled/ recommended follow up visits, this note will serve as a discharge from care along with most recent update on progress.

## 2022-08-11 ENCOUNTER — CARE COORDINATION (OUTPATIENT)
Dept: CASE MANAGEMENT | Age: 72
End: 2022-08-11

## 2022-08-15 ENCOUNTER — OFFICE VISIT (OUTPATIENT)
Dept: ORTHOPEDIC SURGERY | Age: 72
End: 2022-08-15

## 2022-08-15 DIAGNOSIS — Z96.651 STATUS POST TOTAL RIGHT KNEE REPLACEMENT: Primary | ICD-10-CM

## 2022-08-15 PROCEDURE — 99024 POSTOP FOLLOW-UP VISIT: CPT | Performed by: ORTHOPAEDIC SURGERY

## 2022-08-15 NOTE — Clinical Note
[facility]  [address] [facility] [address]  Phone: [phone]  Fax: [phone]    Christophe Obrien MD        August 15, 2022     Patient: Nataliia Riuz   YOB: 1950   Date of Visit: 8/15/2022       To Whom It May Concern: It is my medical opinion that Nataliia Laws {Work release (duty restriction):11126}. If you have any questions or concerns, please don't hesitate to call.     Sincerely,        Christophe Obrien MD [facility] Infiltrative Histology Text: There were numerous aggregates of basaloid cells demonstrating an infiltrative pattern.

## 2022-08-15 NOTE — PROGRESS NOTES
Chief Complaint  Follow-up (Right knee s/p 6 weeks TKR)      History of Present Illness:  Spike Hensley is a pleasant 71 y.o. male here for follow-up regarding his right knee. He is 6 weeks status post right total knee arthroplasty. Doing well with no concerns. Denies any new issues or setbacks. Has been compliant with physical therapy. Works as a  and would like to know when he can return to work. Medical History:  Patient's medications, allergies, past medical, surgical, social and family histories were reviewed and updated as appropriate. ROS: Review of systems reviewed from Patient History Form completed today and available in the patient's chart under the Media tab. Pertinent items are noted in HPI  Review of systems reviewed from Patient History Form completed today and available in the patient's chart under the Media tab. Vital Signs: There were no vitals taken for this visit. Right knee examination:    Gait: No use of assistive devices. No antalgic gait. Alignment: normal alignment. Inspection/skin: Skin is intact without erythema or ecchymosis. No gross deformity. Palpation: mild crepitus. no joint line tenderness present. Range of Motion: 0-110    Strength: Normal quadriceps development. Effusion: trace effusion    Ligamentous stability: No cruciate or collateral ligament instability. Neurologic and vascular: Skin is warm and well-perfused. Sensation is intact to light-touch. Special tests: Negative Kyra sign. Radiology:       Pertinent imaging was interpreted and reviewed with the patient today, images only - no report available. No new imaging was obtained during today's visit. Assessment : 71-year-old male 6-week status post right total knee arthroplasty    Impression:  Encounter Diagnosis   Name Primary?     Status post total right knee replacement Yes       Office Procedures:  No orders of the defined types were placed in this encounter. Plan: Pertinent imaging was reviewed. The etiology, natural history, and treatment options for the disorder were discussed. The roles of activity medication, antiinflammatories, injections, bracing, physical therapy, and surgical interventions were all described to the patient and questions were answered. Patient is doing very well for 6 weeks postop. He expresses desire to return to work. I believe he may return when he is ready to do so. He will call us when he is ready to receive a return to work note. Follow up in 6 weeks or sooner if worsening symptoms  Jo Freed is in agreement with this plan. All questions were answered to patient's satisfaction and was encouraged to call with any further questions. Gwendolyn Gagnon, [address] [facility]  8/15/2022    During this exam, I, Gwendolyn Gagnon PA-C, functioned as a scribe for Dr. Gabby Godoy. The history taking and physical examination were performed by Dr. Gabby Godoy. All counseling during the appointment was performed between the patient and Dr. Gabby Godoy. 8/15/2022 11:44 AM    This dictation was performed with a verbal recognition program (DRAGON) and it was checked for errors. It is possible that there are still dictated errors within this office note. If so, please bring any areas to my attention for an addendum. All efforts were made to ensure that this office note is accurate. I attest that I met personally with the patient, performed the described exam, reviewed the radiographic studies and medical records associated with this patient and supervised the services that are described above.      Virlinda Boast MD

## 2022-08-17 ENCOUNTER — CARE COORDINATION (OUTPATIENT)
Dept: CASE MANAGEMENT | Age: 72
End: 2022-08-17

## 2022-08-17 ENCOUNTER — TREATMENT (OUTPATIENT)
Dept: PHYSICAL THERAPY | Age: 72
End: 2022-08-17
Payer: MEDICARE

## 2022-08-17 DIAGNOSIS — R26.9 GAIT DIFFICULTY: ICD-10-CM

## 2022-08-17 DIAGNOSIS — M25.561 CHRONIC PAIN OF RIGHT KNEE: ICD-10-CM

## 2022-08-17 DIAGNOSIS — G89.29 CHRONIC PAIN OF RIGHT KNEE: ICD-10-CM

## 2022-08-17 DIAGNOSIS — M17.11 OSTEOARTHRITIS OF RIGHT KNEE, UNSPECIFIED OSTEOARTHRITIS TYPE: Primary | ICD-10-CM

## 2022-08-17 DIAGNOSIS — R29.898 WEAKNESS OF EXTREMITY: ICD-10-CM

## 2022-08-17 PROCEDURE — 97112 NEUROMUSCULAR REEDUCATION: CPT | Performed by: PHYSICAL THERAPIST

## 2022-08-17 PROCEDURE — 97530 THERAPEUTIC ACTIVITIES: CPT | Performed by: PHYSICAL THERAPIST

## 2022-08-17 PROCEDURE — 97110 THERAPEUTIC EXERCISES: CPT | Performed by: PHYSICAL THERAPIST

## 2022-08-17 NOTE — PROGRESS NOTES
[facility]   Phone: [phone]    Fax: [phone]     Physical Therapy Re-Certification Plan of Care    Dear  Dr. Laura Chapman,    We had the pleasure of treating the following patient for physical therapy services at [address] [place] [address]. A summary of our findings can be found in the updated assessment below. This includes our plan of care. If you have any questions or concerns regarding these findings, please do not hesitate to contact me at the office phone number checked above. Thank you for the referral.     Physician Signature:________________________________Date:__________________  By signing above (or electronic signature), therapists plan is approved by physician      Overall Response to Treatment:   [x]Patient is responding well to treatment and improvement is noted with regards  to goals   []Patient should continue to improve in reasonable time if they continue HEP   []Patient has plateaued and is no longer responding to skilled PT intervention    []Patient is getting worse and would benefit from return to referring MD   []Patient unable to adhere to initial POC   []Other: Pt is progressing well w/ rehab after R TKA 6+ weeks ago. Pt has been able to control pain w/ activity modifications and icing. He is progressing in function w/ min limitations. Therapy is progressing to add endurance to program for RTW in a couple of weeks. Pt's vital are stable during therapy session. Recommend that pt continue therapy to reach goals and progress function for 4 more weeks.     Physical Therapy Treatment Note/ Progress Report:       Date:  2022    Patient Name:  Rayray Taylor    :  1950  MRN: [id number]  Restrictions/Precautions: cardiac concerns, recent surg,   Medical/Treatment Diagnosis Information:  Diagnosis: R TKA   DOS: R TKA on 22      Treating Diagnosis: limited gt skills, weakness in R LE, pain in knee, edema in R knee, limited 2x10    CC TKE    Balance Biodex L9 x2 LOS     SLS 20\"x1 ea side  1 finger needed for assistance eo,ec, airex    PRE     Extension X30 20# RANGE:   Flexion 3x15 B LE 35# B conc, R ecc RANGE:   Leg Press 3x15 B # RANGE:        SLR+ 20\" +5 pumps=7         Ladder drill         Cable Column     Ed given on POC, expectations and goals 10'; 8/10/22 reviewed         Manual/Modalities               PQRS: Reviewed medication list with patient. No changes since last PT visit. (8/17/22)      Therapeutic Exercise and NMR EXR  [x] (28442) Provided verbal/tactile cueing for activities related to strengthening, flexibility, endurance, ROM for improvements in LE, proximal hip, and core control with self care, mobility, lifting, ambulation.  [] (61028) Provided verbal/tactile cueing for activities related to improving balance, coordination, kinesthetic sense, posture, motor skill, proprioception  to assist with LE, proximal hip, and core control in self care, mobility, lifting, ambulation and eccentric single leg control. NMR and Therapeutic Activities:    [] (94868 or 97083) Provided verbal/tactile cueing for activities related to improving balance, coordination, kinesthetic sense, posture, motor skill, proprioception and motor activation to allow for proper function of core, proximal hip and LE with self care and ADLs  [] (99055) Gait Re-education- Provided training and instruction to the patient for proper LE, core and proximal hip recruitment and positioning and eccentric body weight control with ambulation re-education including up and down stairs     Home Exercise Program:    [x] (07497) Reviewed/Progressed HEP activities related to strengthening, flexibility, endurance, ROM of core, proximal hip and LE for functional self-care, mobility, lifting and ambulation/stair navigation            Written HEP est    Access Code: [email or web address]  URL: [email or web address]  Date: 07/18/2022  Prepared by: Caprice Borjas Wells    Exercises  Gastroc Stretch on Wall - 2-3 x daily - 7 x weekly - 1 sets - 10 reps - 10 hold  Seated Table Hamstring Stretch - 2-3 x daily - 7 x weekly - 1 sets - 10 reps - 10 hold  Supine Quad Set - 2-3 x daily - 7 x weekly - 1 sets - 10 reps - 10 hold  Small Range Straight Leg Raise - 2-3 x daily - 7 x weekly - 3 sets - 10 reps  Sidelying Hip Abduction - 2-3 x daily - 7 x weekly - 3 sets - 10 reps  Standing Terminal Knee Extension with Resistance - 2-3 x daily - 7 x weekly - 1 sets - 10 reps - 10 hold  Sitting Heel Slide with Towel - 2-3 x daily - 7 x weekly - 1 sets - 10 reps - 10 hold      [] (82562)Reviewed/Progressed HEP activities related to improving balance, coordination, kinesthetic sense, posture, motor skill, proprioception of core, proximal hip and LE for self care, mobility, lifting, and ambulation/stair navigation      Manual Treatments:  PROM / STM / Oscillations-Mobs:  G-I, II, III, IV (PA's, Inf., Post.)  [] (42008) Provided manual therapy to mobilize LE, proximal hip and/or LS spine soft tissue/joints for the purpose of modulating pain, promoting relaxation,  increasing ROM, reducing/eliminating soft tissue swelling/inflammation/restriction, improving soft tissue extensibility and allowing for proper ROM for normal function with self care, mobility, lifting and ambulation. Modalities:  CP x10 min in elevation   [] GAME READY (VASO)- for significant edema, swelling, pain control.      Charges:  Timed Code Treatment Minutes: 55   Total Treatment Minutes: 65     [] EVAL (LOW) 11010 (typically 20 minutes face-to-face)  [] EVAL (MOD) 43637 (typically 30 minutes face-to-face)  [] EVAL (HIGH) 75252 (typically 45 minutes face-to-face)  [] RE-EVAL   [x] HF(46648) 2 x  30'   [] IONTO  [x] NMR (70366) x 10'    [] VASO  [] Manual (04330) x      [] Other:  [x] TA (27244) 1 x   15'   [] Mech Traction (32674)  [] ES(attended) (77657)      [] ES (un) (13410):     GOALS:  Patient stated goal: to return Other    ASSESSMENT: ROM in R knee still good w/ min stretching needed to reach goals. Tolerated advancements in reps to help w/ endurance for long work shifts. Pt is managing symptoms well and staying active at home for overall conditioning. Progressing well toward inc in function. Treatment/Activity Tolerance:  [] Patient tolerated treatment well [] Patient limited by fatique  [x] Patient limited by pain  [] Patient limited by other medical complications  [] Other:     Prognosis: [x] Good [] Fair  [] Poor    Patient Requires Follow-up: [x] Yes  [] No    PLAN: Cont therapy for rehab after R TKA. [x] Continue per plan of care [] Alter current plan (see comments)  [x] Plan of care initiated [] Hold pending MD visit [] Discharge    Electronically signed by: Tereso Pino PT, MS, OMT-C    Physical Therapist [address] [facility] [place] license #[id number]  Physical Therapist [place] license #[id number]    Note: If patient does not return for scheduled/ recommended follow up visits, this note will serve as a discharge from care along with most recent update on progress.

## 2022-08-17 NOTE — CARE COORDINATION
Called patient for updates. No answer and unable to leave a message. Will continue to follow for further needs.   Shakir Parkinson BSN  Care Transition Nurse for [facility]o bundle  [phone]

## 2022-08-19 ENCOUNTER — TREATMENT (OUTPATIENT)
Dept: PHYSICAL THERAPY | Age: 72
End: 2022-08-19

## 2022-08-19 ENCOUNTER — TELEPHONE (OUTPATIENT)
Dept: ORTHOPEDIC SURGERY | Age: 72
End: 2022-08-19

## 2022-08-19 DIAGNOSIS — M25.561 CHRONIC PAIN OF RIGHT KNEE: ICD-10-CM

## 2022-08-19 DIAGNOSIS — R26.9 GAIT DIFFICULTY: ICD-10-CM

## 2022-08-19 DIAGNOSIS — M17.11 OSTEOARTHRITIS OF RIGHT KNEE, UNSPECIFIED OSTEOARTHRITIS TYPE: Primary | ICD-10-CM

## 2022-08-19 DIAGNOSIS — G89.29 CHRONIC PAIN OF RIGHT KNEE: ICD-10-CM

## 2022-08-19 DIAGNOSIS — R29.898 WEAKNESS OF EXTREMITY: ICD-10-CM

## 2022-08-19 NOTE — TELEPHONE ENCOUNTER
Notified [facility] regarding the updated [facility] for patient

## 2022-08-19 NOTE — PROGRESS NOTES
[facility]   Phone: [phone]    Fax: [phone]            Physical Therapy  Cancellation/No-show Note  Patient Name:  Ivan Mayer  :  1950   Date:  2022  Cancelled visits to date: 1  No-shows to date: 0    For today's appointment patient:  [x]  Cancelled  []  Rescheduled appointment  []  No-show     Reason given by patient:  []  Patient ill  []  Conflicting appointment  []  No transportation    []  Conflict with work  []  No reason given  [x]  Other:  Pt arrived to clinic and was feeling good. He took his medicine as prescribed but stated that one of his medicine that he went to fill was $600 so he did not get it. He did inform his primary MD of this and she gave him a 1 month supply. His blood pressure was /99 rested 10 min and rechecked 129/101- therapy was stopped. Pt was recommended to take it easy this AM and monitor blood pressure and contact primary MD, if he continued to have high blood pressure readings. Comments:      Phone call information:   []  Phone call made today to patient at _ time at number provided:      []  Patient answered, conversation as follows:    []  Patient did not answer, message left as follows:  []  Phone call not made today  []  Phone call not needed - pt contacted us to cancel and provided reason for cancellation.      Electronically signed by:  Randy Glasgow PT, Jose Antonio Queen 88, OMT-C    Physical Therapist [id number] [address] license #[id number]  Physical Therapist [place] license #[id number]

## 2022-08-22 ENCOUNTER — TREATMENT (OUTPATIENT)
Dept: PHYSICAL THERAPY | Age: 72
End: 2022-08-22
Payer: MEDICARE

## 2022-08-22 DIAGNOSIS — M17.11 OSTEOARTHRITIS OF RIGHT KNEE, UNSPECIFIED OSTEOARTHRITIS TYPE: Primary | ICD-10-CM

## 2022-08-22 DIAGNOSIS — G89.29 CHRONIC PAIN OF RIGHT KNEE: ICD-10-CM

## 2022-08-22 DIAGNOSIS — R29.898 WEAKNESS OF EXTREMITY: ICD-10-CM

## 2022-08-22 DIAGNOSIS — M25.561 CHRONIC PAIN OF RIGHT KNEE: ICD-10-CM

## 2022-08-22 DIAGNOSIS — R26.9 GAIT DIFFICULTY: ICD-10-CM

## 2022-08-22 PROCEDURE — 97110 THERAPEUTIC EXERCISES: CPT | Performed by: PHYSICAL THERAPIST

## 2022-08-22 PROCEDURE — 97112 NEUROMUSCULAR REEDUCATION: CPT | Performed by: PHYSICAL THERAPIST

## 2022-08-22 PROCEDURE — 97530 THERAPEUTIC ACTIVITIES: CPT | Performed by: PHYSICAL THERAPIST

## 2022-08-22 NOTE — PROGRESS NOTES
[facility]   Phone: [phone]    Fax: [phone]      Physical Therapy Treatment Note/ Progress Report:       Date:  2022    Patient Name:  Kyle Myers    :  1950  MRN: [id number]  Restrictions/Precautions: cardiac concerns, recent surg,   Medical/Treatment Diagnosis Information:  Diagnosis: R TKA   DOS: R TKA on 22      Treating Diagnosis: limited gt skills, weakness in R LE, pain in knee, edema in R knee, limited ROM; Insurance/Certification information:  PT Insurance Information: medicare/[employer]  Physician Information:  Referring Practitioner: Dr. Kayden Beltrán  Has the plan of care been signed (Y/N):        [x]  Yes  []  No     Date of Patient follow up with Physician: 8/15/22      Is this a Progress Report:     []  Yes  [x]  No        If Yes:  Date Range for reporting period:  Beginning 22  Ending 22    Progress report will be due (10 Rx or 30 days whichever is less): 3/24/06      Recertification will be due (POC Duration  / 90 days whichever is less): 22         Visit # Insurance Allowable Auth Required   8 25 []  Yes []  No        Functional Scale: YBXV 65    Date assessed:  22 FOTO 75; LEFS 67.2      Latex Allergy:  [x]NO      []YES  Preferred Language for Healthcare:   [x]English       []other:    Pain level:  1-2/10 w/ OTC mediation only     SUBJECTIVE:  8+  weeks s/p surg  Pt had a good weekend and knee is doing well. Pt has been monitoring his BP that continues to fluctuate.        OBJECTIVE: See eval  Observation:    22 /78 and   22 /75 and HR 84  22 /88 and HR 84  22  /89 and HR 81  22 /82 and HR 87 after bike but before resistance exercises  8/10/22 /85 and HR 81 after bike but before resistance exercises  22 /89 and HR 88 after bike but before resistance exercises  22 sent home due to being too high and therapy stopped  8/22/22 /88    Flexibility L R Comment   Hamstring Min limitations Mod limitations 8/3/2022   Gastroc Mod limitations Min limitations     ITB         Quad                         ROM LEFT RIGHT   HIP Flex      HIP Abd       HIP Ext       HIP IR       HIP ER       Knee ext   0(-10)   Knee Flex   127 deg(95 on ERMI)   Ankle PF       Ankle DF       Ankle In       Ankle Ev       Strength LEFT RIGHT   HIP Flexors       HIP Abductors       HIP Ext       Hip ER       Knee EXT (quad) good Fair+   Knee Flex (HS)       Ankle DF       Ankle PF       Ankle Inv       Ankle EV               Circumference  Mid apex  7 cm prox       39 cm  41 cm    43 cm  45cm         Reflexes/Sensation:              []Dermatomes/Myotomes intact              []Reflexes equal and normal bilaterally              [x]Other: 7/18/22 deferred due to surg; intact to light touch; some numbness noted on lat R knee;     Joint mobility: 7/18/22 deferred due to surg;              []Normal                      []Hypo              []Hyper     Palpation: general pain around knee w/ warmth related to edema in R LE extending into lower leg;      Functional Mobility/Transfers: indep transfers w/o assistance or extra time needed;    Posture: ectomorph body structure;      Bandages/Dressings/Incisions: incision closed and healing well w/ complete closure; general redness noted in R LE vs L LE;     Gait: (include devices/WB status) amb normal gt pattern w/o AD in R UE & symmetry in stride length;      Test measurements:      RESTRICTIONS/PRECAUTIONS: DM, cardiac ablation on 7/14/22 ; R TKA on 6/28/22    Exercises/Interventions:   Exercise/Equipment Resistance/Repetitions Other comments   Cardio/Warm-up     Bike Oscillations for ROM x5'    Treadmill          Stretching     Hamstring Seated 10\"x10    Hip Flexion     ITB     Grion     Quad     Inclined Calf  on incline 10\"x10    Towel Pull          ROM     Passive ERMI 10\"x10    Active     Weight Shift Weight Hangs     Sheet Pulls     Ankle Pumps           Patellar Glides     Medial     Superior     Inferior          STRENGTH     SLR     Supine Over foam roll 3x15 4#    Prone     Abduction Side lying 3x15 4#    Adducton PS + bridging 2x10    SLR+          Isometrics     Quad sets 10\"x10 over foam roll          CKC     Calf raises x50 5#    Wall sits     Step ups & over L3 x15    Lateral Step ups L3 x15    1 leg stand     Monster walks NPV    Squatting To chair w/ airex 2x10    CC TKE    Balance     SLS 20\"x1 ea side  1 finger needed for assistance eo,ec, airex    PRE     Extension X30 20# RANGE:   Flexion 3x15 B LE 40# B conc, R ecc RANGE:   Leg Press 3x15 B # RANGE:        SLR+ 20\" +5 pumps=7         Ladder drill         Cable Column     Ed given on POC, expectations and goals 10'; 8/10/22 reviewed         Bike  X20' min on his own for endurance w/ RTW; slow pace         Manual/Modalities               PQRS: Reviewed medication list with patient. No changes since last PT visit. (8/22/22)      Therapeutic Exercise and NMR EXR  [x] (97004) Provided verbal/tactile cueing for activities related to strengthening, flexibility, endurance, ROM for improvements in LE, proximal hip, and core control with self care, mobility, lifting, ambulation.  [] (28192) Provided verbal/tactile cueing for activities related to improving balance, coordination, kinesthetic sense, posture, motor skill, proprioception  to assist with LE, proximal hip, and core control in self care, mobility, lifting, ambulation and eccentric single leg control.      NMR and Therapeutic Activities:    [] (14573 or 43978) Provided verbal/tactile cueing for activities related to improving balance, coordination, kinesthetic sense, posture, motor skill, proprioception and motor activation to allow for proper function of core, proximal hip and LE with self care and ADLs  [] (41815) Gait Re-education- Provided training and instruction to the patient for proper LE, core and proximal hip recruitment and positioning and eccentric body weight control with ambulation re-education including up and down stairs     Home Exercise Program:    [x] (39816) Reviewed/Progressed HEP activities related to strengthening, flexibility, endurance, ROM of core, proximal hip and LE for functional self-care, mobility, lifting and ambulation/stair navigation            Written HEP est    Access Code: [email or web address]  URL: [email or web address]  Date: 07/18/2022  Prepared by: Sherita Ba    Exercises  Gastroc Stretch on Wall - 2-3 x daily - 7 x weekly - 1 sets - 10 reps - 10 hold  Seated Table Hamstring Stretch - 2-3 x daily - 7 x weekly - 1 sets - 10 reps - 10 hold  Supine Quad Set - 2-3 x daily - 7 x weekly - 1 sets - 10 reps - 10 hold  Small Range Straight Leg Raise - 2-3 x daily - 7 x weekly - 3 sets - 10 reps  Sidelying Hip Abduction - 2-3 x daily - 7 x weekly - 3 sets - 10 reps  Standing Terminal Knee Extension with Resistance - 2-3 x daily - 7 x weekly - 1 sets - 10 reps - 10 hold  Sitting Heel Slide with Towel - 2-3 x daily - 7 x weekly - 1 sets - 10 reps - 10 hold      [] (94555)Reviewed/Progressed HEP activities related to improving balance, coordination, kinesthetic sense, posture, motor skill, proprioception of core, proximal hip and LE for self care, mobility, lifting, and ambulation/stair navigation      Manual Treatments:  PROM / STM / Oscillations-Mobs:  G-I, II, III, IV (PA's, Inf., Post.)  [] (63633) Provided manual therapy to mobilize LE, proximal hip and/or LS spine soft tissue/joints for the purpose of modulating pain, promoting relaxation,  increasing ROM, reducing/eliminating soft tissue swelling/inflammation/restriction, improving soft tissue extensibility and allowing for proper ROM for normal function with self care, mobility, lifting and ambulation. Modalities:  CP x10 min in elevation   [] GAME READY (VASO)- for significant edema, swelling, pain control. Charges:  Timed Code Treatment Minutes: 55   Total Treatment Minutes: 65     [] EVAL (LOW) 70843 (typically 20 minutes face-to-face)  [] EVAL (MOD) 28110 (typically 30 minutes face-to-face)  [] EVAL (HIGH) 01342 (typically 45 minutes face-to-face)  [] RE-EVAL   [x] CS(76525) 2 x  30'   [] IONTO  [x] NMR (50696) x 10'    [] VASO  [] Manual (62014) x      [] Other:  [x] TA (76845) 1 x   15'   [] Mech Traction (94393)  [] ES(attended) (72067)      [] ES (un) (19813):     GOALS:  Patient stated goal: to return to work as a  w/ squatting and lifting  [] Progressing: [] Met: [] Not Met: [] Adjusted     Therapist goals for Patient:  Short Term Goals: To be achieved in: 2 weeks  1. Independent in HEP and progression per patient tolerance, in order to prevent re-injury. [] Progressing: [] Met: [] Not Met: [] Adjusted  2. Patient will have a decrease in pain to facilitate improvement in movement, function, and ADLs as indicated by Functional Deficits. [] Progressing: [] Met: [] Not Met: [] Adjusted     Long Term Goals: To be achieved in: 12 weeks  1. Functional index score of 85 or more for FOTO to assist with reaching prior level of function. [] Progressing: [] Met: [] Not Met: [] Adjusted  2. Patient will demonstrate increased AROM to 0-125 to allow for proper joint functioning as indicated by patients Functional Deficits. [x] Progressing: [x] Met: [] Not Met: [] Adjusted  3. Patient will demonstrate an increase in Strength to good proximal hip strength and control, within 5lb HHD in LE to allow for proper functional mobility as indicated by patients Functional Deficits. [x] Progressing: [] Met: [] Not Met: [] Adjusted  4. Patient will return to 90% functional activities without increased symptoms or restriction. [x] Progressing: [] Met: [] Not Met: [] Adjusted  5.  Pt will be able to walk and stand for 8 hr/day to return to work. (patient specific functional goal)    [x] Progressing: [] Met: [] Not Met: [] Adjusted         Overall Progression Towards Functional goals/ Treatment Progress Update:  [x] Patient is progressing as expected towards functional goals listed. [] Progression is slowed due to complexities/Impairments listed. [] Progression has been slowed due to co-morbidities. [] Plan just implemented, too soon to assess goals progression <30days   [] Goals require adjustment due to lack of progress  [] Patient is not progressing as expected and requires additional follow up with physician  [] Other    ASSESSMENT: ROM in R knee remaining good. Strength in LE progressing steadily. Pt tolerated program well w/ min fatigue and no adverse effects in knee or cardio issues. Pt was encouraged to take rest breaks and not hold his breath to minimize elevation in blood pressure. Function progressing steadily toward full activities w/o limitations. Treatment/Activity Tolerance:  [x] Patient tolerated treatment well [x] Patient limited by fatique  [] Patient limited by pain  [] Patient limited by other medical complications  [] Other:     Prognosis: [x] Good [] Fair  [] Poor    Patient Requires Follow-up: [x] Yes  [] No    PLAN: Cont therapy for rehab after R TKA. [x] Continue per plan of care [] Alter current plan (see comments)  [x] Plan of care initiated [] Hold pending MD visit [] Discharge    Electronically signed by: Sherita Ba, PT, MS, OMT-C    Physical Therapist [id number] [address] license #[id number]  Physical Therapist [place] license #[id number]    Note: If patient does not return for scheduled/ recommended follow up visits, this note will serve as a discharge from care along with most recent update on progress.

## 2022-08-24 ENCOUNTER — TREATMENT (OUTPATIENT)
Dept: PHYSICAL THERAPY | Age: 72
End: 2022-08-24
Payer: MEDICARE

## 2022-08-24 DIAGNOSIS — G89.29 CHRONIC PAIN OF RIGHT KNEE: ICD-10-CM

## 2022-08-24 DIAGNOSIS — R29.898 WEAKNESS OF EXTREMITY: ICD-10-CM

## 2022-08-24 DIAGNOSIS — R26.9 GAIT DIFFICULTY: ICD-10-CM

## 2022-08-24 DIAGNOSIS — M25.561 CHRONIC PAIN OF RIGHT KNEE: ICD-10-CM

## 2022-08-24 DIAGNOSIS — M17.11 OSTEOARTHRITIS OF RIGHT KNEE, UNSPECIFIED OSTEOARTHRITIS TYPE: Primary | ICD-10-CM

## 2022-08-24 PROCEDURE — 97110 THERAPEUTIC EXERCISES: CPT | Performed by: PHYSICAL THERAPIST

## 2022-08-24 PROCEDURE — 97112 NEUROMUSCULAR REEDUCATION: CPT | Performed by: PHYSICAL THERAPIST

## 2022-08-24 PROCEDURE — 97530 THERAPEUTIC ACTIVITIES: CPT | Performed by: PHYSICAL THERAPIST

## 2022-08-24 NOTE — PROGRESS NOTES
86    REASSESS NPV  Flexibility L R Comment   Hamstring Min limitations min limitations 8/3/2022   Gastroc Mod limitations Min limitations     ITB         Quad                         ROM LEFT RIGHT   HIP Flex      HIP Abd       HIP Ext       HIP IR       HIP ER       Knee ext   0(-10)   Knee Flex   127 deg(95 on ERMI)   Ankle PF       Ankle DF       Ankle In       Ankle Ev       Strength LEFT RIGHT   HIP Flexors       HIP Abductors       HIP Ext       Hip ER       Knee EXT (quad) good Fair+   Knee Flex (HS)       Ankle DF       Ankle PF       Ankle Inv       Ankle EV               Circumference  Mid apex  7 cm prox       39 cm  41 cm    43 cm  45cm         Reflexes/Sensation:              []Dermatomes/Myotomes intact              []Reflexes equal and normal bilaterally              [x]Other: 7/18/22 deferred due to surg; intact to light touch; some numbness noted on lat R knee;     Joint mobility: 7/18/22 deferred due to surg;              []Normal                      []Hypo              []Hyper     Palpation: general pain around knee w/ warmth related to edema in R LE extending into lower leg;      Functional Mobility/Transfers: indep transfers w/o assistance or extra time needed;    Posture: ectomorph body structure;      Bandages/Dressings/Incisions: incision closed and healing well w/ complete closure; general redness noted in R LE vs L LE;     Gait: (include devices/WB status) amb normal gt pattern w/o AD in R UE & symmetry in stride length;      Test measurements:      RESTRICTIONS/PRECAUTIONS: DM, cardiac ablation on 7/14/22 ; R TKA on 6/28/22    Exercises/Interventions:   Exercise/Equipment Resistance/Repetitions Other comments   Cardio/Warm-up     Bike Oscillations for ROM x5'    Treadmill          Stretching     Hamstring Seated 10\"x10    Hip Flexion     ITB     Grion     Quad     Inclined Calf  on incline 10\"x10    Towel Pull          ROM     Passive ERMI 10\"x10    Active     Weight Shift     Weight Hangs     Sheet Pulls     Ankle Pumps           Patellar Glides     Medial     Superior     Inferior          STRENGTH     SLR     Supine Over foam roll 3x15 4#    Prone     Abduction Side lying 3x15 4#    Adducton PS + bridging 2x10    Single leg bridge 1x10    SLR+          Isometrics     Quad sets         CKC     Calf raises x50 5#    Wall sits     Step ups & over L3 x15    Lateral Step ups L3 x15    1 leg stand     Monster walks Blue TB 3 laps    Squatting To chair w/ airex 2x10    CC TKE    Balance         Reaching for 2 cups over table x10 ea    PRE     Extension X30 25# RANGE:   Flexion 3x15 B LE 45# B conc, R ecc RANGE:   Leg Press 3x15 B # RANGE:        SLR+ 20\" +5 pumps=7         Ladder drill         Cable Column     Ed given on POC, expectations and goals 10'; 8/10/22 reviewed         Bike  X20' min on his own for endurance w/ RTW; slow pace         Manual/Modalities               PQRS: Reviewed medication list with patient. No changes since last PT visit. (8/24/22)      Therapeutic Exercise and NMR EXR  [x] (15102) Provided verbal/tactile cueing for activities related to strengthening, flexibility, endurance, ROM for improvements in LE, proximal hip, and core control with self care, mobility, lifting, ambulation.  [] (04219) Provided verbal/tactile cueing for activities related to improving balance, coordination, kinesthetic sense, posture, motor skill, proprioception  to assist with LE, proximal hip, and core control in self care, mobility, lifting, ambulation and eccentric single leg control.      NMR and Therapeutic Activities:    [] (18923 or 20605) Provided verbal/tactile cueing for activities related to improving balance, coordination, kinesthetic sense, posture, motor skill, proprioception and motor activation to allow for proper function of core, proximal hip and LE with self care and ADLs  [] (62126) Gait Re-education- Provided training and instruction to the patient for proper LE, core and proximal hip recruitment and positioning and eccentric body weight control with ambulation re-education including up and down stairs     Home Exercise Program:    [x] (68286) Reviewed/Progressed HEP activities related to strengthening, flexibility, endurance, ROM of core, proximal hip and LE for functional self-care, mobility, lifting and ambulation/stair navigation            Written HEP est    Access Code: [email or web address]  URL: [email or web address]  Date: 07/18/2022  Prepared by: Karen Ji    Exercises  Gastroc Stretch on Wall - 2-3 x daily - 7 x weekly - 1 sets - 10 reps - 10 hold  Seated Table Hamstring Stretch - 2-3 x daily - 7 x weekly - 1 sets - 10 reps - 10 hold  Supine Quad Set - 2-3 x daily - 7 x weekly - 1 sets - 10 reps - 10 hold  Small Range Straight Leg Raise - 2-3 x daily - 7 x weekly - 3 sets - 10 reps  Sidelying Hip Abduction - 2-3 x daily - 7 x weekly - 3 sets - 10 reps  Standing Terminal Knee Extension with Resistance - 2-3 x daily - 7 x weekly - 1 sets - 10 reps - 10 hold  Sitting Heel Slide with Towel - 2-3 x daily - 7 x weekly - 1 sets - 10 reps - 10 hold      [] (46047)Reviewed/Progressed HEP activities related to improving balance, coordination, kinesthetic sense, posture, motor skill, proprioception of core, proximal hip and LE for self care, mobility, lifting, and ambulation/stair navigation      Manual Treatments:  PROM / STM / Oscillations-Mobs:  G-I, II, III, IV (PA's, Inf., Post.)  [] (54106) Provided manual therapy to mobilize LE, proximal hip and/or LS spine soft tissue/joints for the purpose of modulating pain, promoting relaxation,  increasing ROM, reducing/eliminating soft tissue swelling/inflammation/restriction, improving soft tissue extensibility and allowing for proper ROM for normal function with self care, mobility, lifting and ambulation. Modalities:  CP x10 min in elevation   [] GAME READY (VASO)- for significant edema, swelling, pain control.      Charges:  Timed Code Treatment Minutes: 55   Total Treatment Minutes: 65     [] EVAL (LOW) 35284 (typically 20 minutes face-to-face)  [] EVAL (MOD) 01325 (typically 30 minutes face-to-face)  [] EVAL (HIGH) 78732 (typically 45 minutes face-to-face)  [] RE-EVAL   [x] TE(47834) 2 x  30'   [] IONTO  [x] NMR (54562) x 10'    [] VASO  [] Manual (06998) x      [] Other:  [x] TA (80454) 1 x   15'   [] Mech Traction (08850)  [] ES(attended) (34604)      [] ES (un) (62587):     GOALS:  Patient stated goal: to return to work as a  w/ squatting and lifting  [] Progressing: [] Met: [] Not Met: [] Adjusted     Therapist goals for Patient:  Short Term Goals: To be achieved in: 2 weeks  1. Independent in HEP and progression per patient tolerance, in order to prevent re-injury. [] Progressing: [] Met: [] Not Met: [] Adjusted  2. Patient will have a decrease in pain to facilitate improvement in movement, function, and ADLs as indicated by Functional Deficits. [] Progressing: [] Met: [] Not Met: [] Adjusted     Long Term Goals: To be achieved in: 12 weeks  1. Functional index score of 85 or more for FOTO to assist with reaching prior level of function. [] Progressing: [] Met: [] Not Met: [] Adjusted  2. Patient will demonstrate increased AROM to 0-125 to allow for proper joint functioning as indicated by patients Functional Deficits. [x] Progressing: [x] Met: [] Not Met: [] Adjusted  3. Patient will demonstrate an increase in Strength to good proximal hip strength and control, within 5lb HHD in LE to allow for proper functional mobility as indicated by patients Functional Deficits. [x] Progressing: [] Met: [] Not Met: [] Adjusted  4. Patient will return to 90% functional activities without increased symptoms or restriction. [x] Progressing: [] Met: [] Not Met: [] Adjusted  5.  Pt will be able to walk and stand for 8 hr/day to return to work. (patient specific functional goal)    [x] Progressing: [] Met: [] Not Met: [] Adjusted

## 2022-08-25 ENCOUNTER — CARE COORDINATION (OUTPATIENT)
Dept: CASE MANAGEMENT | Age: 72
End: 2022-08-25

## 2022-08-25 NOTE — CARE COORDINATION
Called patient for updates. Left message for return call.   Elizabeth Soto BSN  Care Transition Nurse for [facility]  [phone]

## 2022-08-29 ENCOUNTER — TREATMENT (OUTPATIENT)
Dept: PHYSICAL THERAPY | Age: 72
End: 2022-08-29
Payer: MEDICARE

## 2022-08-29 DIAGNOSIS — M17.11 OSTEOARTHRITIS OF RIGHT KNEE, UNSPECIFIED OSTEOARTHRITIS TYPE: Primary | ICD-10-CM

## 2022-08-29 DIAGNOSIS — M25.561 CHRONIC PAIN OF RIGHT KNEE: ICD-10-CM

## 2022-08-29 DIAGNOSIS — R29.898 WEAKNESS OF EXTREMITY: ICD-10-CM

## 2022-08-29 DIAGNOSIS — G89.29 CHRONIC PAIN OF RIGHT KNEE: ICD-10-CM

## 2022-08-29 DIAGNOSIS — R26.9 GAIT DIFFICULTY: ICD-10-CM

## 2022-08-29 PROCEDURE — 97112 NEUROMUSCULAR REEDUCATION: CPT | Performed by: PHYSICAL THERAPIST

## 2022-08-29 PROCEDURE — 97110 THERAPEUTIC EXERCISES: CPT | Performed by: PHYSICAL THERAPIST

## 2022-08-29 PROCEDURE — 97530 THERAPEUTIC ACTIVITIES: CPT | Performed by: PHYSICAL THERAPIST

## 2022-08-29 NOTE — PROGRESS NOTES
[facility]   Phone: [phone]    Fax: [phone]   Physical Therapy Re-Certification Plan of Care    Dear  Dr. Rose Marie Carvalho,    We had the pleasure of treating the following patient for physical therapy services at [address] [place] [address]. A summary of our findings can be found in the updated assessment below. This includes our plan of care. If you have any questions or concerns regarding these findings, please do not hesitate to contact me at the office phone number checked above. Thank you for the referral.     Physician Signature:________________________________Date:__________________  By signing above (or electronic signature), therapists plan is approved by physician      Overall Response to Treatment:   [x]Patient is responding well to treatment and improvement is noted with regards  to goals   []Patient should continue to improve in reasonable time if they continue HEP   []Patient has plateaued and is no longer responding to skilled PT intervention    []Patient is getting worse and would benefit from return to referring MD   []Patient unable to adhere to initial POC   []Other: Pt is progressing well toward goals of full function. Pt has reach ROM goals for R knee. Pt has been able to go up and  down stairs w/ more control and less int UE assistance. Balance both static and dynamic is very hard for pt on both LE's individually. Strength and endurance progressing for RTC next week. Pt has been hard working and very motivated to get back to job w/o restrictions. Recommend that pt continue therapy int over the next 2 weeks w/ transition back to work.      Physical Therapy Treatment Note/ Progress Report:       Date:  2022    Patient Name:  Ramirez Treviño    :  1950  MRN: [id number]  Restrictions/Precautions: cardiac concerns, recent surg,   Medical/Treatment Diagnosis Information:  Diagnosis: R TKA   DOS: R TKA on 22      Treating Diagnosis: limited gt skills, weakness in R LE, pain in knee, edema in R knee, limited ROM; Insurance/Certification information:  PT Insurance Information: medicare/BCBS  Physician Information:  Referring Practitioner: Dr. Darian Barragan  Has the plan of care been signed (Y/N):        [x]  Yes  []  No     Date of Patient follow up with Physician: 8/15/22      Is this a Progress Report:     []  Yes  [x]  No        If Yes:  Date Range for reporting period:  Beginning 7/18/22  Ending 8/18/22    Progress report will be due (10 Rx or 30 days whichever is less): 8/23/87      Recertification will be due (POC Duration  / 90 days whichever is less): 8/18/22         Visit # Insurance Allowable Auth Required   9 25 []  Yes []  No        Functional Scale: [facility] 96    Date assessed:  7/18/22 8/17/22 FOTO 75; LEFS 67.2      Latex Allergy:  [x]NO      []YES  Preferred Language for Healthcare:   [x]English       []other:    Pain level:  0/10 w/ OTC mediation int     SUBJECTIVE:  9  weeks s/p surg  Pt states he is feeling good. Knee is moving smoothly. Pt changed his brakes over the weekend and did well w/o problems.        OBJECTIVE: See eval  Observation:    7/18/22 /78 and   7/20/22 /75 and HR 84  7/27/22 /88 and HR 84  8/1/22  /89 and HR 81  8/8/22 /82 and HR 87 after bike but before resistance exercises  8/10/22 /85 and HR 81 after bike but before resistance exercises  8/17/22 /89 and HR 88 after bike but before resistance exercises  8/19/22 sent home due to being too high and therapy stopped  8/22/22 /88  8/24/22 /72 and 85  8/29/22 /70 and 74    REASSESS NPV  Flexibility L R Comment   Hamstring Min limitations min limitations 8/3/2022   Gastroc Mod limitations Min limitations     ITB         Quad                         ROM LEFT RIGHT   HIP Flex      HIP Abd       HIP Ext       HIP IR       HIP ER       Knee ext   0(-10)   Knee Flex   127 deg(95 on ERMI) Ankle PF       Ankle DF       Ankle In       Ankle Ev       Strength LEFT RIGHT   HIP Flexors       HIP Abductors       HIP Ext       Hip ER       Knee EXT (quad) good Good-   Knee Flex (HS)       Ankle DF       Ankle PF       Ankle Inv       Ankle EV               Circumference  Mid apex  7 cm prox       39 cm  41 cm    43 cm  45cm         Reflexes/Sensation:              []Dermatomes/Myotomes intact              []Reflexes equal and normal bilaterally              [x]Other: 7/18/22 deferred due to surg; intact to light touch; some numbness noted on lat R knee;     Joint mobility: 7/18/22 deferred due to surg;              []Normal                      []Hypo              []Hyper     Palpation: general pain around knee w/ warmth related to edema in R LE extending into lower leg;      Functional Mobility/Transfers: indep transfers w/o assistance or extra time needed;    Posture: endomorph body structure;      Bandages/Dressings/Incisions: incision closed and healing well w/ complete closure; general redness noted in R LE vs L LE resolving;     Gait: (include devices/WB status) amb normal gt pattern w/o AD in R UE & symmetry in stride length;      Test measurements:      RESTRICTIONS/PRECAUTIONS: DM, cardiac ablation on 7/14/22 ; R TKA on 6/28/22    Exercises/Interventions:   Exercise/Equipment Resistance/Repetitions Other comments   Cardio/Warm-up     Bike Oscillations for ROM x5'    Treadmill          Stretching     Hamstring Seated 10\"x10    Hip Flexion     ITB     Grion     Quad     Inclined Calf  on incline 10\"x10    Towel Pull          ROM     Passive ERMI 10\"x10    Active     Weight Shift     Weight Hangs     Sheet Pulls     Ankle Pumps           Patellar Glides     Medial     Superior     Inferior          STRENGTH     SLR     Supine Over foam roll 3x15 5#    Prone     Abduction Side lying 3x15 5#    Adducton PS + bridging 2x10    Single leg bridge 1x10    SLR+          Isometrics     Quad sets         CKC self-care, mobility, lifting and ambulation/stair navigation            Written HEP est    Access Code: [email or web address]  URL: [email or web address]  Date: 07/18/2022  Prepared by: Dilia Botello    Exercises  Gastroc Stretch on Wall - 2-3 x daily - 7 x weekly - 1 sets - 10 reps - 10 hold  Seated Table Hamstring Stretch - 2-3 x daily - 7 x weekly - 1 sets - 10 reps - 10 hold  Supine Quad Set - 2-3 x daily - 7 x weekly - 1 sets - 10 reps - 10 hold  Small Range Straight Leg Raise - 2-3 x daily - 7 x weekly - 3 sets - 10 reps  Sidelying Hip Abduction - 2-3 x daily - 7 x weekly - 3 sets - 10 reps  Standing Terminal Knee Extension with Resistance - 2-3 x daily - 7 x weekly - 1 sets - 10 reps - 10 hold  Sitting Heel Slide with Towel - 2-3 x daily - 7 x weekly - 1 sets - 10 reps - 10 hold      [] (30109)Reviewed/Progressed HEP activities related to improving balance, coordination, kinesthetic sense, posture, motor skill, proprioception of core, proximal hip and LE for self care, mobility, lifting, and ambulation/stair navigation      Manual Treatments:  PROM / STM / Oscillations-Mobs:  G-I, II, III, IV (PA's, Inf., Post.)  [] (55095) Provided manual therapy to mobilize LE, proximal hip and/or LS spine soft tissue/joints for the purpose of modulating pain, promoting relaxation,  increasing ROM, reducing/eliminating soft tissue swelling/inflammation/restriction, improving soft tissue extensibility and allowing for proper ROM for normal function with self care, mobility, lifting and ambulation. Modalities:  CP x10 min in elevation   [] GAME READY (VASO)- for significant edema, swelling, pain control.      Charges:  Timed Code Treatment Minutes: 55   Total Treatment Minutes: 65     [] EVAL (LOW) 18804 (typically 20 minutes face-to-face)  [] EVAL (MOD) 58615 (typically 30 minutes face-to-face)  [] EVAL (HIGH) 63464 (typically 45 minutes face-to-face)  [] RE-EVAL   [x] HU(86013) 2 x  30'   [] IONTO  [x] NMR (12554) x 10'    [] VASO  [] Manual (96136) x      [] Other:  [x] TA (40428) 1 x   15'   [] Mech Traction (74864)  [] ES(attended) (11236)      [] ES (un) (81311):     GOALS:  Patient stated goal: to return to work as a  w/ squatting and lifting  [] Progressing: [] Met: [] Not Met: [] Adjusted     Therapist goals for Patient:  Short Term Goals: To be achieved in: 2 weeks  1. Independent in HEP and progression per patient tolerance, in order to prevent re-injury. [] Progressing: [] Met: [] Not Met: [] Adjusted  2. Patient will have a decrease in pain to facilitate improvement in movement, function, and ADLs as indicated by Functional Deficits. [] Progressing: [] Met: [] Not Met: [] Adjusted     Long Term Goals: To be achieved in: 12 weeks  1. Functional index score of 85 or more for FOTO to assist with reaching prior level of function. [x] Progressing: [] Met: [] Not Met: [] Adjusted  2. Patient will demonstrate increased AROM to 0-125 to allow for proper joint functioning as indicated by patients Functional Deficits. [] Progressing: [x] Met: [] Not Met: [] Adjusted  3. Patient will demonstrate an increase in Strength to good proximal hip strength and control, within 5lb HHD in LE to allow for proper functional mobility as indicated by patients Functional Deficits. [x] Progressing: [] Met: [] Not Met: [] Adjusted  4. Patient will return to 90% functional activities without increased symptoms or restriction. [x] Progressing: [] Met: [] Not Met: [] Adjusted  5. Pt will be able to walk and stand for 8 hr/day to return to work. (patient specific functional goal)    [x] Progressing: [] Met: [] Not Met: [] Adjusted         Overall Progression Towards Functional goals/ Treatment Progress Update:  [x] Patient is progressing as expected towards functional goals listed. [] Progression is slowed due to complexities/Impairments listed. [] Progression has been slowed due to co-morbidities.   [] Plan just implemented, too soon to assess goals progression <30days   [] Goals require adjustment due to lack of progress  [] Patient is not progressing as expected and requires additional follow up with physician  [] Other    ASSESSMENT:Pt is progressing in strength and function in R LE. Pt had some fatigue from program but min rest breaks between exercises. Endurance improving. Pt still struggles w/ balance both static and dynamic on each leg. Pt continues to manage symptoms well as function continues to improve. Treatment/Activity Tolerance:  [x] Patient tolerated treatment well [x] Patient limited by fatique  [] Patient limited by pain  [] Patient limited by other medical complications  [] Other:     Prognosis: [x] Good [] Fair  [] Poor    Patient Requires Follow-up: [x] Yes  [] No    PLAN: Cont therapy for rehab after R TKA. [x] Continue per plan of care [] Alter current plan (see comments)  [x] Plan of care initiated [] Hold pending MD visit [] Discharge    Electronically signed by: Marcos Calvo, PT, MS, OMT-C    Physical Therapist [place] license #[id number]  Physical Therapist [place] license #[id number]    Note: If patient does not return for scheduled/ recommended follow up visits, this note will serve as a discharge from care along with most recent update on progress.

## 2022-08-31 ENCOUNTER — TREATMENT (OUTPATIENT)
Dept: PHYSICAL THERAPY | Age: 72
End: 2022-08-31
Payer: MEDICARE

## 2022-08-31 DIAGNOSIS — R29.898 WEAKNESS OF EXTREMITY: ICD-10-CM

## 2022-08-31 DIAGNOSIS — R26.9 GAIT DIFFICULTY: ICD-10-CM

## 2022-08-31 DIAGNOSIS — G89.29 CHRONIC PAIN OF RIGHT KNEE: ICD-10-CM

## 2022-08-31 DIAGNOSIS — M17.11 OSTEOARTHRITIS OF RIGHT KNEE, UNSPECIFIED OSTEOARTHRITIS TYPE: Primary | ICD-10-CM

## 2022-08-31 DIAGNOSIS — M25.561 CHRONIC PAIN OF RIGHT KNEE: ICD-10-CM

## 2022-08-31 PROCEDURE — 97110 THERAPEUTIC EXERCISES: CPT | Performed by: PHYSICAL THERAPIST

## 2022-08-31 PROCEDURE — 97530 THERAPEUTIC ACTIVITIES: CPT | Performed by: PHYSICAL THERAPIST

## 2022-08-31 PROCEDURE — 97112 NEUROMUSCULAR REEDUCATION: CPT | Performed by: PHYSICAL THERAPIST

## 2022-08-31 NOTE — PROGRESS NOTES
[facility]   Phone: [phone]    Fax: [phone]  Physical Therapy Treatment Note/ Progress Report:       Date:  2022    Patient Name:  Maria Eugenia Perkins    :  1950  MRN: [id number]  Restrictions/Precautions: cardiac concerns, recent surg,   Medical/Treatment Diagnosis Information:  Diagnosis: R TKA   DOS: R TKA on 22      Treating Diagnosis: limited gt skills, weakness in R LE, pain in knee, edema in R knee, limited ROM; Insurance/Certification information:  PT Insurance Information: medicare/[employer]  Physician Information:  Referring Practitioner: Dr. Roe Merino  Has the plan of care been signed (Y/N):        [x]  Yes  []  No     Date of Patient follow up with Physician: 8/15/22      Is this a Progress Report:     []  Yes  [x]  No        If Yes:  Date Range for reporting period:  Beginning 22  Ending     Progress report will be due (10 Rx or 30 days whichever is less):       Recertification will be due (POC Duration  / 90 days whichever is less): 22         Visit # Insurance Allowable Auth Required   15 25 []  Yes []  No        Functional Scale: QXKL 94    Date assessed:  22 FOTO 75; LEFS 67.2      Latex Allergy:  [x]NO      []YES  Preferred Language for Healthcare:   [x]English       []other:    Pain level:  0/10 w/ OTC mediation int     SUBJECTIVE:  9+  weeks s/p surg  Pt states he is feeling good. Knee is moving smoothly.        OBJECTIVE: See eval  Observation:    22 /78 and   22 /75 and HR 84  22 /88 and HR 84  22   /89 and HR 81  22   /82 and HR 87   8/10/22 /85 and HR 81   22 /89 and HR 88   22 sent home due to being too high and therapy stopped  22 /88  22 /72 and 85  22 /70 and 74      Flexibility L R Comment   Hamstring Min limitations min limitations 8/3/2022   Gastroc Mod limitations Min limitations     ITB         Quad                         ROM LEFT RIGHT   HIP Flex      HIP Abd       HIP Ext       HIP IR       HIP ER       Knee ext   0(-10)   Knee Flex   127 deg(95 on ERMI)   Ankle PF       Ankle DF       Ankle In       Ankle Ev       Strength LEFT RIGHT   HIP Flexors       HIP Abductors       HIP Ext       Hip ER       Knee EXT (quad) good Good-   Knee Flex (HS)       Ankle DF       Ankle PF       Ankle Inv       Ankle EV               Circumference  Mid apex  7 cm prox       39 cm  41 cm    43 cm  45cm         Reflexes/Sensation:              []Dermatomes/Myotomes intact              []Reflexes equal and normal bilaterally              [x]Other: 7/18/22 deferred due to surg; intact to light touch; some numbness noted on lat R knee;     Joint mobility: 7/18/22 deferred due to surg;              []Normal                      []Hypo              []Hyper     Palpation: general pain around knee w/ warmth related to edema in R LE extending into lower leg;      Functional Mobility/Transfers: indep transfers w/o assistance or extra time needed;    Posture: endomorph body structure;      Bandages/Dressings/Incisions: incision closed and healing well w/ complete closure; general redness noted in R LE vs L LE resolving;     Gait: (include devices/WB status) amb normal gt pattern w/o AD in R UE & symmetry in stride length;      Test measurements:      RESTRICTIONS/PRECAUTIONS: DM, cardiac ablation on 7/14/22 ; R TKA on 6/28/22    Exercises/Interventions:   Exercise/Equipment Resistance/Repetitions Other comments   Cardio/Warm-up     Bike Oscillations for ROM x5'    Treadmill          Stretching     Hamstring Seated 10\"x10    Hip Flexion     ITB     Grion     Quad     Inclined Calf  on incline 10\"x10    Towel Pull          ROM     Passive ERMI 10\"x10    Active     Weight Shift     Weight Hangs     Sheet Pulls     Ankle Pumps           Patellar Glides     Medial     Superior     Inferior          STRENGTH SLR     Supine Over foam roll 3x15 5#    Prone     Abduction Side lying 3x15 5#    Adducton PS + bridging 2x10    Single leg bridge 2x10    SLR+          Isometrics     Quad sets         CKC     Calf raises x50 5#    Wall sits     Step ups & over L3 x15    Lateral Step ups L3 x15    1 leg stand     Monster walks Blue TB 5 laps    Squatting To chair w/ airex 3x10    CC TKE    Balance         Reaching for 4 cups over table x5 ea    PRE     Extension X30 25# RANGE:   Flexion 3x15 B LE 45# B conc, R ecc RANGE:   Leg Press 3x15 B # RANGE:        SLR+ 20\" +5 pumps=7         Ladder drill         Cable Column     Ed given on POC, expectations and goals 10'; 8/31/22 reviewed expectations 7 suggestion for RTW- icing, stretching HEP only         Bike  X20' min on his own for endurance w/ RTW; slow pace         Manual/Modalities               PQRS: Reviewed medication list with patient. No changes since last PT visit. (8/31/22)      Therapeutic Exercise and NMR EXR  [x] (27584) Provided verbal/tactile cueing for activities related to strengthening, flexibility, endurance, ROM for improvements in LE, proximal hip, and core control with self care, mobility, lifting, ambulation.  [] (04021) Provided verbal/tactile cueing for activities related to improving balance, coordination, kinesthetic sense, posture, motor skill, proprioception  to assist with LE, proximal hip, and core control in self care, mobility, lifting, ambulation and eccentric single leg control.      NMR and Therapeutic Activities:    [] (02325 or 49125) Provided verbal/tactile cueing for activities related to improving balance, coordination, kinesthetic sense, posture, motor skill, proprioception and motor activation to allow for proper function of core, proximal hip and LE with self care and ADLs  [] (56512) Gait Re-education- Provided training and instruction to the patient for proper LE, core and proximal hip recruitment and positioning and eccentric body weight control with ambulation re-education including up and down stairs     Home Exercise Program:    [x] (81927) Reviewed/Progressed HEP activities related to strengthening, flexibility, endurance, ROM of core, proximal hip and LE for functional self-care, mobility, lifting and ambulation/stair navigation            Written HEP est    Access Code: [email or web address]  URL: [email or web address]  Date: 07/18/2022  Prepared by: Gretta Valdez    Exercises  Gastroc Stretch on Wall - 2-3 x daily - 7 x weekly - 1 sets - 10 reps - 10 hold  Seated Table Hamstring Stretch - 2-3 x daily - 7 x weekly - 1 sets - 10 reps - 10 hold  Supine Quad Set - 2-3 x daily - 7 x weekly - 1 sets - 10 reps - 10 hold  Small Range Straight Leg Raise - 2-3 x daily - 7 x weekly - 3 sets - 10 reps  Sidelying Hip Abduction - 2-3 x daily - 7 x weekly - 3 sets - 10 reps  Standing Terminal Knee Extension with Resistance - 2-3 x daily - 7 x weekly - 1 sets - 10 reps - 10 hold  Sitting Heel Slide with Towel - 2-3 x daily - 7 x weekly - 1 sets - 10 reps - 10 hold      [] (06441)Reviewed/Progressed HEP activities related to improving balance, coordination, kinesthetic sense, posture, motor skill, proprioception of core, proximal hip and LE for self care, mobility, lifting, and ambulation/stair navigation      Manual Treatments:  PROM / STM / Oscillations-Mobs:  G-I, II, III, IV (PA's, Inf., Post.)  [] (71135) Provided manual therapy to mobilize LE, proximal hip and/or LS spine soft tissue/joints for the purpose of modulating pain, promoting relaxation,  increasing ROM, reducing/eliminating soft tissue swelling/inflammation/restriction, improving soft tissue extensibility and allowing for proper ROM for normal function with self care, mobility, lifting and ambulation. Modalities:  CP x10 min in elevation   [] GAME READY (VASO)- for significant edema, swelling, pain control.      Charges:  Timed Code Treatment Minutes: 55   Total Treatment Minutes: 85     [] EVAL (LOW) 47960 (typically 20 minutes face-to-face)  [] EVAL (MOD) 28166 (typically 30 minutes face-to-face)  [] EVAL (HIGH) 58748 (typically 45 minutes face-to-face)  [] RE-EVAL   [x] SQ(21153) 2 x  30'   [] IONTO  [x] NMR (56565) x 10'    [] VASO  [] Manual (46697) x      [] Other:  [x] TA (45792) 1 x   15'   [] Mech Traction (89195)  [] ES(attended) (43756)      [] ES (un) (52633):     GOALS:  Patient stated goal: to return to work as a  w/ squatting and lifting  [] Progressing: [] Met: [] Not Met: [] Adjusted     Therapist goals for Patient:  Short Term Goals: To be achieved in: 2 weeks  1. Independent in HEP and progression per patient tolerance, in order to prevent re-injury. [] Progressing: [] Met: [] Not Met: [] Adjusted  2. Patient will have a decrease in pain to facilitate improvement in movement, function, and ADLs as indicated by Functional Deficits. [] Progressing: [] Met: [] Not Met: [] Adjusted     Long Term Goals: To be achieved in: 12 weeks  1. Functional index score of 85 or more for FOTO to assist with reaching prior level of function. [x] Progressing: [] Met: [] Not Met: [] Adjusted  2. Patient will demonstrate increased AROM to 0-125 to allow for proper joint functioning as indicated by patients Functional Deficits. [] Progressing: [x] Met: [] Not Met: [] Adjusted  3. Patient will demonstrate an increase in Strength to good proximal hip strength and control, within 5lb HHD in LE to allow for proper functional mobility as indicated by patients Functional Deficits. [x] Progressing: [] Met: [] Not Met: [] Adjusted  4. Patient will return to 90% functional activities without increased symptoms or restriction. [x] Progressing: [] Met: [] Not Met: [] Adjusted  5.  Pt will be able to walk and stand for 8 hr/day to return to work. (patient specific functional goal)    [x] Progressing: [] Met: [] Not Met: [] Adjusted         Overall Progression Towards Functional goals/ Treatment Progress Update:  [x] Patient is progressing as expected towards functional goals listed. [] Progression is slowed due to complexities/Impairments listed. [] Progression has been slowed due to co-morbidities. [] Plan just implemented, too soon to assess goals progression <30days   [] Goals require adjustment due to lack of progress  [] Patient is not progressing as expected and requires additional follow up with physician  [] Other    ASSESSMENT: Endurance and tolerance to program good. Pt continues to push himself hard and improve function. Pt does get tired from program but denies pain in knee. Treatment/Activity Tolerance:  [x] Patient tolerated treatment well [x] Patient limited by fatique  [] Patient limited by pain  [] Patient limited by other medical complications  [] Other:     Prognosis: [x] Good [] Fair  [] Poor    Patient Requires Follow-up: [x] Yes  [] No    PLAN: Cont therapy for rehab after R TKA. Cont therapy after RTW next week w/ plans for d/c to HEP. [x] Continue per plan of care [] Alter current plan (see comments)  [x] Plan of care initiated [] Hold pending MD visit [] Discharge    Electronically signed by: Stefany Ramirez, PT, MS, OMT-C    Physical Therapist [id number] [facility] [employer] license #[id number]  Physical Therapist [place] license #[id number]    Note: If patient does not return for scheduled/ recommended follow up visits, this note will serve as a discharge from care along with most recent update on progress.

## 2022-09-01 ENCOUNTER — TELEPHONE (OUTPATIENT)
Dept: ORTHOPEDIC SURGERY | Age: 72
End: 2022-09-01

## 2022-09-01 NOTE — TELEPHONE ENCOUNTER
General Question     Subject: RETURN TO WORK PAPER   Patient and /or Facility Request: Keri Lanes  Contact Number:  [phone]    PATIENT WOULD LIKE A CALL TO DISCUSS RETURN TO WORK PAPERWORK PATIENT IS LOOKING TO RETURN ON SEPT 6 PATIENT CAN BE REACHED AT THE NUMBER LISTED ABOVE

## 2022-09-09 ENCOUNTER — CARE COORDINATION (OUTPATIENT)
Dept: CASE MANAGEMENT | Age: 72
End: 2022-09-09

## 2022-09-09 NOTE — CARE COORDINATION
Spoke with patient. Incision status: No complications. Healed. Pain level and status: States pain is tolerable. Bowels: No complaints. Outpatient therapy: Continues once a week    Do you have all of your medications: Yes    Changes in medications: No    Instructed patient that bundle program and follow up phone calls are ending. Instructed patient to follow up with Dr. Cathy Gilford for any complications/concerns.     Follow up appointments:    Future Appointments   Date Time Provider Ana María Rodriguez   9/14/2022  8:30 AM LEA Crystal PT [facility]   9/28/2022  8:30 AM LEA Crystal PT   10/3/2022 10:00 AM Donald Chambers MD [facility] Henderson Asa BSN  Care Transition Nurse for ortho bundle  [phone]

## 2022-09-14 ENCOUNTER — TREATMENT (OUTPATIENT)
Dept: PHYSICAL THERAPY | Age: 72
End: 2022-09-14
Payer: MEDICARE

## 2022-09-14 DIAGNOSIS — R29.898 WEAKNESS OF EXTREMITY: ICD-10-CM

## 2022-09-14 DIAGNOSIS — M17.11 OSTEOARTHRITIS OF RIGHT KNEE, UNSPECIFIED OSTEOARTHRITIS TYPE: Primary | ICD-10-CM

## 2022-09-14 DIAGNOSIS — R26.9 GAIT DIFFICULTY: ICD-10-CM

## 2022-09-14 PROCEDURE — 97112 NEUROMUSCULAR REEDUCATION: CPT | Performed by: PHYSICAL THERAPIST

## 2022-09-14 PROCEDURE — 97110 THERAPEUTIC EXERCISES: CPT | Performed by: PHYSICAL THERAPIST

## 2022-09-14 NOTE — PROGRESS NOTES
/88  8/24/22 /73 and 86  8/29/22 /71 and 75      Flexibility L R Comment   Hamstring Min limitations min limitations 9/14/2022   Gastroc WNL WNL     ITB         Quad                         ROM LEFT RIGHT   HIP Flex      HIP Abd       HIP Ext       HIP IR       HIP ER       Knee ext   0(-10)   Knee Flex   127 deg   Ankle PF       Ankle DF       Ankle In       Ankle Ev       Strength LEFT RIGHT   HIP Flexors       HIP Abductors       HIP Ext       Hip ER       Knee EXT (quad) 46 lb 50 lb   Knee Flex (HS) 44 lb   41 lb   Ankle DF       Ankle PF       Ankle Inv       Ankle EV               Circumference  Mid apex  7 cm prox       39 cm  41 cm    43 cm  45cm         Reflexes/Sensation:              []Dermatomes/Myotomes intact              []Reflexes equal and normal bilaterally              [x]Other: 7/18/22 deferred due to surg; intact to light touch; some numbness noted on lat R knee;     Joint mobility: 7/18/22 deferred due to surg;              []Normal                      []Hypo              []Hyper     Palpation: min tenderness in R knee generally; Functional Mobility/Transfers: indep transfers w/o assistance or extra time needed;    Posture: endomorph body structure;      Bandages/Dressings/Incisions: incision closed and healing well w/ complete closure;      Gait: (include devices/WB status) amb normal gt pattern w/o AD in R UE & symmetry in stride length;      Test measurements:      RESTRICTIONS/PRECAUTIONS: DM, cardiac ablation on 7/14/22 ; R TKA on 6/28/22    Exercises/Interventions:   Exercise/Equipment Resistance/Repetitions Other comments   Cardio/Warm-up     Bike Oscillations for ROM x5'    Treadmill          Stretching     Hamstring Seated 10\"x10    Hip Flexion     ITB     Grion     Quad     Inclined Calf  on incline 10\"x10    Towel Pull          ROM     Passive ERMI 10\"x10    Active     Weight Shift     Weight Hangs     Sheet Pulls     Ankle Pumps           Patellar Glides Medial     Superior     Inferior          STRENGTH     SLR     Supine    Prone    Abduction    Adducton    Single leg bridge 2x10    SLR+          Isometrics     Quad sets         CKC     Calf raises x50 5#    Wall sits     Step ups & over    Lateral Step ups    1 leg stand     Monster walks Blue TB 5 laps    Squatting To chair w/ airex 3x10    CC TKE    Balance         Reaching for 4 cups over table x5 ea    PRE     Extension X30 25# RANGE:   Flexion 3x15 B LE 45# B conc, R ecc RANGE:   Leg Press 3x15 B # RANGE:        SLR+ 20\" +5 pumps=7         Ladder drill         Cable Column     Ed given on POC, expectations and goals 10'; 8/31/22 reviewed expectations 7 suggestion for RTW- icing, stretching HEP only         Bike  X20' min on his own for endurance w/ RTW; slow pace         Manual/Modalities               PQRS: Reviewed medication list with patient. No changes since last PT visit. (9/14/22)      Therapeutic Exercise and NMR EXR  [x] (76829) Provided verbal/tactile cueing for activities related to strengthening, flexibility, endurance, ROM for improvements in LE, proximal hip, and core control with self care, mobility, lifting, ambulation.  [] (93130) Provided verbal/tactile cueing for activities related to improving balance, coordination, kinesthetic sense, posture, motor skill, proprioception  to assist with LE, proximal hip, and core control in self care, mobility, lifting, ambulation and eccentric single leg control.      NMR and Therapeutic Activities:    [] (05663 or 80071) Provided verbal/tactile cueing for activities related to improving balance, coordination, kinesthetic sense, posture, motor skill, proprioception and motor activation to allow for proper function of core, proximal hip and LE with self care and ADLs  [] (61662) Gait Re-education- Provided training and instruction to the patient for proper LE, core and proximal hip recruitment and positioning and eccentric body weight control with ambulation re-education including up and down stairs     Home Exercise Program:    [x] (09256) Reviewed/Progressed HEP activities related to strengthening, flexibility, endurance, ROM of core, proximal hip and LE for functional self-care, mobility, lifting and ambulation/stair navigation            Written HEP est    Access Code: [email or web address]  URL: [email or web address]  Date: 07/18/2022  Prepared by: Flora Mix    Exercises  Gastroc Stretch on Wall - 2-3 x daily - 7 x weekly - 1 sets - 10 reps - 10 hold  Seated Table Hamstring Stretch - 2-3 x daily - 7 x weekly - 1 sets - 10 reps - 10 hold  Supine Quad Set - 2-3 x daily - 7 x weekly - 1 sets - 10 reps - 10 hold  Small Range Straight Leg Raise - 2-3 x daily - 7 x weekly - 3 sets - 10 reps  Sidelying Hip Abduction - 2-3 x daily - 7 x weekly - 3 sets - 10 reps  Standing Terminal Knee Extension with Resistance - 2-3 x daily - 7 x weekly - 1 sets - 10 reps - 10 hold  Sitting Heel Slide with Towel - 2-3 x daily - 7 x weekly - 1 sets - 10 reps - 10 hold      [] (43920)Reviewed/Progressed HEP activities related to improving balance, coordination, kinesthetic sense, posture, motor skill, proprioception of core, proximal hip and LE for self care, mobility, lifting, and ambulation/stair navigation      Manual Treatments:  PROM / STM / Oscillations-Mobs:  G-I, II, III, IV (PA's, Inf., Post.)  [] (66509) Provided manual therapy to mobilize LE, proximal hip and/or LS spine soft tissue/joints for the purpose of modulating pain, promoting relaxation,  increasing ROM, reducing/eliminating soft tissue swelling/inflammation/restriction, improving soft tissue extensibility and allowing for proper ROM for normal function with self care, mobility, lifting and ambulation. Modalities:  declined    [] GAME READY (VASO)- for significant edema, swelling, pain control.      Charges:  Timed Code Treatment Minutes: 40   Total Treatment Minutes: 40     [] EVAL (LOW) 92360 (typically 20 minutes face-to-face)  [] EVAL (MOD) 85615 (typically 30 minutes face-to-face)  [] EVAL (HIGH) 33110 (typically 45 minutes face-to-face)  [] RE-EVAL   [x] ME(28837) 2x  25'   [] IONTO  [x] NMR (15277) x 10'    [] VASO  [] Manual (93400) x      [] Other:  [] TA (09266) 1 x   15'   [] Mech Traction (77869)  [] ES(attended) (56030)      [] ES (un) (07730):     GOALS:  Patient stated goal: to return to work as a  w/ squatting and lifting  [] Progressing: [] Met: [] Not Met: [] Adjusted     Therapist goals for Patient:  Short Term Goals: To be achieved in: 2 weeks  1. Independent in HEP and progression per patient tolerance, in order to prevent re-injury. [] Progressing: [] Met: [] Not Met: [] Adjusted  2. Patient will have a decrease in pain to facilitate improvement in movement, function, and ADLs as indicated by Functional Deficits. [] Progressing: [] Met: [] Not Met: [] Adjusted     Long Term Goals: To be achieved in: 12 weeks  1. Functional index score of 85 or more for FOTO to assist with reaching prior level of function. [x] Progressing: [] Met: [] Not Met: [] Adjusted  2. Patient will demonstrate increased AROM to 0-125 to allow for proper joint functioning as indicated by patients Functional Deficits. [] Progressing: [x] Met: [] Not Met: [] Adjusted  3. Patient will demonstrate an increase in Strength to good proximal hip strength and control, within 5lb HHD in LE to allow for proper functional mobility as indicated by patients Functional Deficits. [x] Progressing: [x] Met: [] Not Met: [] Adjusted  4. Patient will return to 90% functional activities without increased symptoms or restriction. [x] Progressing: [x] Met: [] Not Met: [] Adjusted  5.  Pt will be able to walk and stand for 8 hr/day to return to work. (patient specific functional goal)    [x] Progressing: [x] Met: [] Not Met: [] Adjusted         Overall Progression Towards Functional goals/ Treatment Progress Update:  [x] Patient is progressing as expected towards functional goals listed. [] Progression is slowed due to complexities/Impairments listed. [] Progression has been slowed due to co-morbidities. [] Plan just implemented, too soon to assess goals progression <30days   [] Goals require adjustment due to lack of progress  [] Patient is not progressing as expected and requires additional follow up with physician  [] Other    ASSESSMENT: Pt has made great improvement in function after R TKA. He has RTW w/o limitations. ROM remained good after 2 weeks on his own. Pt has improved in function as noted by FOTO. Good progress toward goals. Pt is indep in carrying on HEP on his own. Dynamometer measurements symmetrical between LE's. Treatment/Activity Tolerance:  [x] Patient tolerated treatment well [x] Patient limited by fatique  [] Patient limited by pain  [] Patient limited by other medical complications  [] Other:     Prognosis: [x] Good [] Fair  [] Poor    Patient Requires Follow-up: [x] Yes  [] No    PLAN: D/c to HEP. Pt will continue w/ functional activities for continued strengthening in R LE. Pt has PT contact for any questions or problems. [x] Continue per plan of care [] Alter current plan (see comments)  [x] Plan of care initiated [] Hold pending MD visit [] Discharge    Electronically signed by: Susan Anglin, PT, MS, OMT-C    Physical Therapist [place] license #[id number]  Physical Therapist [place] license #[id number]    Note: If patient does not return for scheduled/ recommended follow up visits, this note will serve as a discharge from care along with most recent update on progress.

## 2022-09-28 ENCOUNTER — TREATMENT (OUTPATIENT)
Dept: PHYSICAL THERAPY | Age: 72
End: 2022-09-28

## 2022-09-28 DIAGNOSIS — M17.11 OSTEOARTHRITIS OF RIGHT KNEE, UNSPECIFIED OSTEOARTHRITIS TYPE: Primary | ICD-10-CM

## 2022-09-28 DIAGNOSIS — R26.9 GAIT DIFFICULTY: ICD-10-CM

## 2022-09-28 DIAGNOSIS — R29.898 WEAKNESS OF EXTREMITY: ICD-10-CM

## 2022-10-03 ENCOUNTER — OFFICE VISIT (OUTPATIENT)
Dept: ORTHOPEDIC SURGERY | Age: 72
End: 2022-10-03

## 2022-10-03 DIAGNOSIS — Z96.651 S/P TOTAL KNEE ARTHROPLASTY, RIGHT: Primary | ICD-10-CM

## 2022-10-03 PROCEDURE — 99024 POSTOP FOLLOW-UP VISIT: CPT | Performed by: PHYSICIAN ASSISTANT

## 2022-10-03 NOTE — PROGRESS NOTES
Chief Complaint  Follow-up (Right knee. S/p tkr )      History of Present Illness:  Dimitri Estrada is a pleasant 71 y.o. male here for follow-up regarding his right knee. He is 3 months status post right total knee arthroplasty. Doing well with no concerns. He does note that he has some anterior numbness as well as a clicking sensation underneath his kneecap. None of this is painful. He is even able to kneel. He has returned to work. Medical History:  Patient's medications, allergies, past medical, surgical, social and family histories were reviewed and updated as appropriate. ROS: Review of systems reviewed from Patient History Form completed today and available in the patient's chart under the Media tab. Pertinent items are noted in HPI  Review of systems reviewed from Patient History Form completed today and available in the patient's chart under the Media tab. Vital Signs: There were no vitals taken for this visit. Right knee examination:    Gait: No use of assistive devices. No antalgic gait. Alignment: normal alignment. Inspection/skin: Skin is intact without erythema or ecchymosis. No gross deformity. Healed incision    Palpation: mild crepitus. no joint line tenderness present. Range of Motion: 0-120    Strength: Normal quadriceps development. Effusion: No effusion or swelling present. Ligamentous stability: No cruciate or collateral ligament instability. Neurologic and vascular: Skin is warm and well-perfused. Sensation is intact to light-touch. Left knee examination:    Gait: No use of assistive devices. No antalgic gait. Alignment: normal alignment. Inspection/skin: Skin is intact without erythema or ecchymosis. No gross deformity. Palpation: mild crepitus. no joint line tenderness present. Range of Motion: There is full range of motion. Strength: Normal quadriceps development. Effusion: No effusion or swelling present. Ligamentous stability: No cruciate or collateral ligament instability. Neurologic and vascular: Skin is warm and well-perfused. Sensation is intact to light-touch. Special tests: Negative Kyra sign. Radiology:       Pertinent imaging was interpreted and reviewed with the patient today, images only - no report available. No new imaging was obtained during today's visit. Assessment : 21-year-old male 3 months status post right total knee arthroplasty. Doing well. Impression:  Encounter Diagnosis   Name Primary? S/P total knee arthroplasty, right Yes       Office Procedures:  No orders of the defined types were placed in this encounter. Plan: Pertinent imaging was reviewed. The etiology, natural history, and treatment options for the disorder were discussed. The roles of activity medication, antiinflammatories, injections, bracing, physical therapy, and surgical interventions were all described to the patient and questions were answered. Patient is doing very well today for 3 months post total knee arthroplasty. I like him to continue working on his strengthening exercises. Continue progressing his activity as tolerated. Follow-up in 6 months or sooner if worsening symptoms  Shai Perez is in agreement with this plan. All questions were answered to patient's satisfaction and was encouraged to call with any further questions. Mitali Gonzalez, [address]  10/3/2022    This dictation was performed with a verbal recognition program [facility]) and it was checked for errors. It is possible that there are still dictated errors within this office note. If so, please bring any areas to my attention for an addendum. All efforts were made to ensure that this office note is accurate.

## 2023-03-06 ENCOUNTER — OFFICE VISIT (OUTPATIENT)
Dept: ORTHOPEDIC SURGERY | Age: 73
End: 2023-03-06

## 2023-03-06 VITALS — BODY MASS INDEX: 34.51 KG/M2 | WEIGHT: 233 LBS | HEIGHT: 69 IN

## 2023-03-06 DIAGNOSIS — Z96.651 S/P TOTAL KNEE ARTHROPLASTY, RIGHT: ICD-10-CM

## 2023-03-06 DIAGNOSIS — M25.561 RIGHT KNEE PAIN, UNSPECIFIED CHRONICITY: Primary | ICD-10-CM

## 2023-03-06 NOTE — PROGRESS NOTES
Chief Complaint  Follow-up (Right knee. S/p tkr)      History of Present Illness:  Barrett Garcia is a pleasant 71 y.o. male who presents today for follow up evaluation of right knee. He is 8 months status post right total knee arthroplasty on 6/28/2022. He states he is doing well with no issues or concerns today. Denies signs of infection. Denies any new injuries. Medical History:  Patient's medications, allergies, past medical, surgical, social and family histories were reviewed and updated as appropriate. Pertinent items are noted in HPI  Review of systems reviewed from Patient History Form completed today and available in the patient's chart under the Media tab. Pain Assessment  Location of Pain: Knee  Location Modifiers: Right  Severity of Pain: 1  Quality of Pain:  (n/a)  Duration of Pain:  (n/a)  Frequency of Pain: Rarely  Aggravating Factors: Stairs  Limiting Behavior: Yes  Relieving Factors: Rest  Result of Injury: No  Work-Related Injury: No  Are there other pain locations you wish to document?: No    Past Medical History:   Diagnosis Date    Arthritis     BPH (benign prostatic hyperplasia)     Diabetes (HCC)     DJD (degenerative joint disease) of knee     Tobacco abuse         Past Surgical History:   Procedure Laterality Date    HERNIA REPAIR      as a child    TONSILLECTOMY      TOTAL KNEE ARTHROPLASTY Right 6/28/2022    RIGHT TOTAL KNEE RPELACEMENT performed by Latoya Rojo MD at [facility] reviewed. No pertinent family history. Social History     Socioeconomic History    Marital status:       Spouse name: None    Number of children: None    Years of education: None    Highest education level: None   Tobacco Use    Smoking status: Every Day     Packs/day: 2.00     Types: Cigarettes    Smokeless tobacco: Never   Vaping Use    Vaping Use: Never used   Substance and Sexual Activity    Alcohol use: No    Drug use: Never       Current Outpatient Medications   Medication Sig Dispense Refill    apixaban (ELIQUIS) 5 MG TABS tablet Take 5 mg by mouth in the morning and 5 mg before bedtime. metFORMIN (GLUCOPHAGE) 500 MG tablet Take 1,000 mg by mouth at bedtime      Multiple Vitamins-Minerals (THERAPEUTIC MULTIVITAMIN-MINERALS) tablet Take 1 tablet by mouth daily      aspirin 325 MG EC tablet Take 1 tablet by mouth daily 30 tablet 0    ondansetron (ZOFRAN) 4 MG tablet Take 1 tablet by mouth every 8 hours as needed for Nausea or Vomiting 15 tablet 0    celecoxib (CELEBREX) 200 MG capsule Take 1 capsule by mouth daily 30 capsule 3    glipiZIDE (GLUCOTROL) 5 MG tablet TAKE 1 TABLET BY MOUTH EVERY DAY      testosterone cypionate (DEPOTESTOTERONE CYPIONATE) 200 MG/ML injection INJECT 1.5ML INTO THE MUSCLE EVERY 14 DAYS  2    ONE TOUCH ULTRA TEST strip use to test blood sugar once daily  11    atorvastatin (LIPITOR) 40 MG tablet TAKE 1 TABLET BY MOUTH ONE TIME A DAY  4    Lancets Misc. MISC 1 Stick by Other route      lisinopril (PRINIVIL;ZESTRIL) 5 MG tablet 10 mg   0    metFORMIN (GLUCOPHAGE) 500 MG tablet 1,000 mg at bedtime 500 mg in the morning and 1000 mg at night       No current facility-administered medications for this visit. Allergies   Allergen Reactions    No Known Allergies        Vital signs:  Ht 5' 9\" (1.753 m)   Wt 233 lb (105.7 kg)   BMI 34.41 kg/m²            Right knee examination:     Gait: No use of assistive devices. No antalgic gait. Alignment: normal alignment. Inspection/skin: Skin is intact without erythema or ecchymosis. No gross deformity. Healed incision     Palpation: mild crepitus. no joint line tenderness present. Range of Motion: 0-125     Strength: Normal quadriceps development. Effusion: No effusion or swelling present. Ligamentous stability: No cruciate or collateral ligament instability. Neurologic and vascular: Skin is warm and well-perfused. Sensation is intact to light-touch.          Radiology:     Pertinent imaging was interpreted and reviewed with the patient. Radiographs were obtained, interpreted and reviewed with the patient in the office; 3 views: bilateral PA, bilateral [employer] AND right lateral    Impression: Components in good position without evidence of loosening. Assessment :  71year old male 8 months status post right total knee arthroplasty    Impression:  Encounter Diagnosis   Name Primary? Right knee pain, unspecified chronicity Yes       Office Procedures:  Orders Placed This Encounter   Procedures    XR KNEE RIGHT (3 VIEWS)     Standing Status:   Future     Number of Occurrences:   1     Standing Expiration Date:   3/6/2024     Order Specific Question:   Reason for exam:     Answer:   pain    XR KNEE LEFT (1-2 VIEWS)     Standing Status:   Future     Number of Occurrences:   1     Standing Expiration Date:   3/6/2024     Order Specific Question:   Reason for exam:     Answer:   pain         Plan: Pertinent imaging was reviewed. The etiology, natural history, and treatment options for the disorder were discussed. The roles of activity medication, antiinflammatories, injections, bracing, physical therapy, and surgical interventions were all described to the patient and questions were answered. Patient is doing very well 8 months out from his total knee replacement. X-rays appear normal and he is happy with his progress. I will see him back in 1 year with new x-rays, sooner if needed. Sanya Chisholm is in agreement with this plan. All questions were answered to patient's satisfaction and was encouraged to call with any further questions. Total time spent for evaluation, education and development of treatment plan: 30 minutes        Oniel SOUSA ATC, am scribing for and in the presence of Dr. Masood Jose.    03/06/23 5:04 PM Oniel Deluca ATC          I personally reviewed the patient's pain scale, review of systems, family history, social history, past medical history, allergies and medications. Review of systems was collected today, reviewed and is included in the medical record. It is available under the media tab. Xiao Hunt MD  Sports Medicine, Knee and Shoulder Surgery    This dictation was performed with a verbal recognition program [facility]) and it was checked for errors. It is possible that there are still dictated errors within this office note. If so, please bring any errors to my attention for an addendum. All efforts were made to ensure that this office note is accurate.

## (undated) DEVICE — TOWEL,STOP FLAG GOLD N-W: Brand: MEDLINE

## (undated) DEVICE — APPLICATOR MEDICATED 26 CC SOLUTION HI LT ORNG CHLORAPREP

## (undated) DEVICE — SOLUTION IV IRRIG WATER 1000ML POUR BRL 2F7114

## (undated) DEVICE — BLANKET WRM W29.9XL79.1IN UP BODY FORC AIR MISTRAL-AIR

## (undated) DEVICE — 3 BONE CEMENT MIXER: Brand: MIXEVAC

## (undated) DEVICE — GLOVE ORANGE PI 7   MSG9070

## (undated) DEVICE — SUTURE VCRL SZ 2-0 L18IN ABSRB UD CT-1 L36MM 1/2 CIR J839D

## (undated) DEVICE — SUTURE STRATAFIX SPRL SZ 1 L14IN ABSRB VLT L48CM CTX 1/2 SXPD2B405

## (undated) DEVICE — ADHESIVE SKIN CLSR 0.7ML TOP DERMBND ADV

## (undated) DEVICE — PENCIL ELECSURG HND CTRL ALL IN 1 MONOPOLAR LAP

## (undated) DEVICE — SYRINGE CATH TIP 50ML

## (undated) DEVICE — SOLUTION IV IRRIG 0.9% NACL 3000ML BAG 2B7477

## (undated) DEVICE — GLOVE SURG SZ 7 L12IN FNGR THK79MIL GRN LTX FREE

## (undated) DEVICE — NEEDLE SUT SZ 3 MAYO 1 2 CIR TAPR PNT DISPOSABLE

## (undated) DEVICE — STERILE PVP: Brand: MEDLINE INDUSTRIES, INC.

## (undated) DEVICE — DUAL CUT SAGITTAL BLADE

## (undated) DEVICE — SUTURE MCRYL SZ 4-0 L27IN ABSRB UD L19MM PS-2 1/2 CIR PRIM Y426H

## (undated) DEVICE — Z DISCONTINUED USE 2744636  DRESSING AQUACEL 14 IN ALG W3.5XL14IN POLYUR FLM CVR W/ HYDRCOLL

## (undated) DEVICE — BLADE SAW W12.5XL70MM THK0.8MM CUT THK1.12MM S STL RECIP

## (undated) DEVICE — Device

## (undated) DEVICE — ZIMMER® STERILE DISPOSABLE TOURNIQUET CUFF WITH PLC, SINGLE PORT, SINGLE BLADDER, 34 IN. (86 CM)

## (undated) DEVICE — GLOVE SURG SZ 9 L1185IN FNGR THK75MIL STRW LTX POLYMER BEAD

## (undated) DEVICE — ZIMMER® STERILE DISPOSABLE TOURNIQUET CUFF WITH PLC, DUAL PORT, SINGLE BLADDER, 30 IN. (76 CM)

## (undated) DEVICE — SST BUR, WIRE PASS DRILL, 2 FLUTES, MED., 2MM DIA.: Brand: MICROAIRE®

## (undated) DEVICE — ELECTRODE PT RET AD L9FT HI MOIST COND ADH HYDRGEL CORDED

## (undated) DEVICE — GLOVE SURG SZ 9 L12IN FNGR THK79MIL GRN LTX FREE

## (undated) DEVICE — DRESSING HYDROFIBER AQUACEL AG ADVANTAGE 3.5X14 IN

## (undated) DEVICE — TOWEL,OR,DSP,ST,BLUE,DLX,8/PK,10PK/CS: Brand: MEDLINE

## (undated) DEVICE — SUTURE VCRL SZ 1 L18IN ABSRB UD L36MM CT-1 1/2 CIR J841D

## (undated) DEVICE — FLUID TRAP FOR MINIVAC ES EQUIP FLD TRAP

## (undated) DEVICE — TOTAL KNEE: Brand: MEDLINE INDUSTRIES, INC.